# Patient Record
Sex: FEMALE | Race: WHITE
[De-identification: names, ages, dates, MRNs, and addresses within clinical notes are randomized per-mention and may not be internally consistent; named-entity substitution may affect disease eponyms.]

---

## 2020-06-14 ENCOUNTER — TRANSCRIPTION ENCOUNTER (OUTPATIENT)
Age: 30
End: 2020-06-14

## 2020-08-07 ENCOUNTER — APPOINTMENT (OUTPATIENT)
Dept: INTERNAL MEDICINE | Facility: CLINIC | Age: 30
End: 2020-08-07
Payer: COMMERCIAL

## 2020-08-07 ENCOUNTER — OUTPATIENT (OUTPATIENT)
Dept: OUTPATIENT SERVICES | Facility: HOSPITAL | Age: 30
LOS: 1 days | Discharge: HOME | End: 2020-08-07

## 2020-08-07 VITALS
TEMPERATURE: 96.8 F | WEIGHT: 140 LBS | SYSTOLIC BLOOD PRESSURE: 119 MMHG | DIASTOLIC BLOOD PRESSURE: 68 MMHG | BODY MASS INDEX: 23.9 KG/M2 | HEIGHT: 64 IN

## 2020-08-07 DIAGNOSIS — J45.990 EXERCISE INDUCED BRONCHOSPASM: ICD-10-CM

## 2020-08-07 PROCEDURE — 99201 OFFICE OUTPATIENT NEW 10 MINUTES: CPT | Mod: GC

## 2020-10-28 PROBLEM — J45.990 EXERCISE-INDUCED ASTHMA: Status: ACTIVE | Noted: 2020-08-07

## 2020-10-28 NOTE — PHYSICAL EXAM
[No Acute Distress] : no acute distress [Normal Sclera/Conjunctiva] : normal sclera/conjunctiva [Normal Outer Ear/Nose] : the outer ears and nose were normal in appearance [No JVD] : no jugular venous distention [No Respiratory Distress] : no respiratory distress  [Normal Rate] : normal rate  [No Edema] : there was no peripheral edema [Soft] : abdomen soft [No HSM] : no HSM [Normal Anterior Cervical Nodes] : no anterior cervical lymphadenopathy [Coordination Grossly Intact] : coordination grossly intact

## 2020-10-28 NOTE — ASSESSMENT
[FreeTextEntry1] : Exercise induced asthma\par - refill LABA/ICS\par \par labs before next visit\par \par RTC 1 year or PRN

## 2020-10-28 NOTE — HISTORY OF PRESENT ILLNESS
[FreeTextEntry8] : 29 Y/O F pmhx exercise induced asthma here to establish care. Asthma controlled on inhaler. Denies N/V/D

## 2020-12-17 ENCOUNTER — TRANSCRIPTION ENCOUNTER (OUTPATIENT)
Age: 30
End: 2020-12-17

## 2020-12-28 ENCOUNTER — TRANSCRIPTION ENCOUNTER (OUTPATIENT)
Age: 30
End: 2020-12-28

## 2021-03-18 LAB
ALBUMIN SERPL ELPH-MCNC: 4.7 G/DL
ALP BLD-CCNC: 32 U/L
ALT SERPL-CCNC: 10 U/L
ANION GAP SERPL CALC-SCNC: 12 MMOL/L
AST SERPL-CCNC: 13 U/L
BASOPHILS # BLD AUTO: 0.05 K/UL
BASOPHILS NFR BLD AUTO: 0.9 %
BILIRUB SERPL-MCNC: <0.2 MG/DL
BUN SERPL-MCNC: 14 MG/DL
CALCIUM SERPL-MCNC: 9.6 MG/DL
CHLORIDE SERPL-SCNC: 101 MMOL/L
CHOLEST SERPL-MCNC: 202 MG/DL
CO2 SERPL-SCNC: 26 MMOL/L
CREAT SERPL-MCNC: 0.8 MG/DL
EOSINOPHIL # BLD AUTO: 0.08 K/UL
EOSINOPHIL NFR BLD AUTO: 1.5 %
ESTIMATED AVERAGE GLUCOSE: 97 MG/DL
GLUCOSE SERPL-MCNC: 133 MG/DL
HBA1C MFR BLD HPLC: 5 %
HCT VFR BLD CALC: 38.4 %
HCV AB SER QL: NONREACTIVE
HCV S/CO RATIO: 0.07 S/CO
HDLC SERPL-MCNC: 68 MG/DL
HGB BLD-MCNC: 12.5 G/DL
HIV1+2 AB SPEC QL IA.RAPID: NONREACTIVE
LDLC SERPL CALC-MCNC: 113 MG/DL
LYMPHOCYTES # BLD AUTO: 2.62 K/UL
LYMPHOCYTES NFR BLD AUTO: 48.3 %
MAN DIFF?: NO
MCHC RBC-ENTMCNC: 29.6 PG
MCHC RBC-ENTMCNC: 32.6 G/DL
MCV RBC AUTO: 90.8 FL
MONOCYTES # BLD AUTO: 0.2 K/UL
MONOCYTES NFR BLD AUTO: 3.7 %
NEUTROPHILS # BLD AUTO: 2.48 K/UL
NEUTROPHILS NFR BLD AUTO: 45.6 %
NONHDLC SERPL-MCNC: 134 MG/DL
PLATELET # BLD AUTO: 227 K/UL
POTASSIUM SERPL-SCNC: 4.1 MMOL/L
PROT SERPL-MCNC: 7 G/DL
RBC # BLD: 4.23 M/UL
RBC # FLD: 12.3 %
SODIUM SERPL-SCNC: 139 MMOL/L
TRIGL SERPL-MCNC: 106 MG/DL
TSH SERPL-ACNC: 1.24 UIU/ML
WBC # FLD AUTO: 5.43 K/UL

## 2021-05-20 ENCOUNTER — NON-APPOINTMENT (OUTPATIENT)
Age: 31
End: 2021-05-20

## 2021-05-21 ENCOUNTER — APPOINTMENT (OUTPATIENT)
Dept: GASTROENTEROLOGY | Facility: CLINIC | Age: 31
End: 2021-05-21

## 2021-05-21 ENCOUNTER — APPOINTMENT (OUTPATIENT)
Dept: GASTROENTEROLOGY | Facility: CLINIC | Age: 31
End: 2021-05-21
Payer: COMMERCIAL

## 2021-05-21 ENCOUNTER — NON-APPOINTMENT (OUTPATIENT)
Age: 31
End: 2021-05-21

## 2021-05-21 ENCOUNTER — OUTPATIENT (OUTPATIENT)
Dept: OUTPATIENT SERVICES | Facility: HOSPITAL | Age: 31
LOS: 1 days | Discharge: HOME | End: 2021-05-21

## 2021-05-21 VITALS
WEIGHT: 137 LBS | HEART RATE: 80 BPM | TEMPERATURE: 97.5 F | BODY MASS INDEX: 23.39 KG/M2 | DIASTOLIC BLOOD PRESSURE: 88 MMHG | OXYGEN SATURATION: 99 % | HEIGHT: 64 IN | SYSTOLIC BLOOD PRESSURE: 130 MMHG

## 2021-05-21 PROCEDURE — 99204 OFFICE O/P NEW MOD 45 MIN: CPT | Mod: GC

## 2021-05-21 RX ORDER — POLYETHYLENE GLYCOL 3350 17 G/17G
17 POWDER, FOR SOLUTION ORAL
Qty: 1 | Refills: 0 | Status: ACTIVE | COMMUNITY
Start: 2021-05-21 | End: 1900-01-01

## 2021-05-21 RX ORDER — SODIUM SULFATE, POTASSIUM SULFATE, MAGNESIUM SULFATE 17.5; 3.13; 1.6 G/ML; G/ML; G/ML
17.5-3.13-1.6 SOLUTION, CONCENTRATE ORAL
Qty: 1 | Refills: 0 | Status: ACTIVE | COMMUNITY
Start: 2021-05-21 | End: 1900-01-01

## 2021-05-21 NOTE — ASSESSMENT
[FreeTextEntry1] : 31 year old female with h/o asthma is here for initial evaluation for diarrhea \par Pt reports diarrhea on and off for long time and also had a colonoscopy in 2014 and was reportedly normal. Recently pt started having approx 3 loose bm /day since dec 2020 worse with food intake, not with any particular diet. Also noted stool floating in toilet bowel with min on and off blood on toilet paper . Pt also reports 20 lbs unintentional weight loss for past 6 months \par No fever, abd pain, nausea, vomiting, melena, dysphagia , family h/o gi malignancy / IBD \par No nocturnal  abdominal pain / diarrhea . \par \par no prior abd imaging available \par recent labs CBC, CMP were grossly unremarkable \par \par #) Chronic diarrhea, on and off \par assoc with weight loss, stool floating in toilet bowel \par assoc with brbpr \par Possible inflammatory vs malabsorption vs r/o infectious causes and others \par Rec:\par Lactose free diet \par check Stool GI PCR, C.diff, o and P\par Check stool lactoferrin, calprotectin, stool elastase  ESR, CRP \par Check TSH , celiac serologies \par Plan for EGD and colonoscopy with biopsies \par will plan for further work up based on above results \par RTC after above \par \par \par

## 2021-05-21 NOTE — END OF VISIT
[] : Fellow [FreeTextEntry3] : chronic diarrhea with floating stools - check stool studies to r/o infection, inflammatory disease, pancreatic insufficiency; EGD/CF

## 2021-05-21 NOTE — HISTORY OF PRESENT ILLNESS
[Heartburn] : denies heartburn [Nausea] : denies nausea [Vomiting] : denies vomiting [Constipation] : denies constipation [Yellow Skin Or Eyes (Jaundice)] : denies jaundice [Abdominal Pain] : denies abdominal pain [Abdominal Swelling] : denies abdominal swelling [Rectal Pain] : denies rectal pain [Wt Loss ___ Lbs] : recent [unfilled] ~Upound(s) weight loss [Diarrhea] : diarrhea [Fair Compliance] : fair compliance with treatment [de-identified] : 31 year old female with h/o asthma is here for initial evaluation for diarrhea \par Pt reports diarrhea on and off for long time and also had a colonoscopy in 2014 and was reportedly normal. Recently pt started having approx 3 loose bm /day since dec 2020 worse with food intake, not with any particular diet. Also noted stool floating in toilet bowel with min on and off blood on toilet paper . Pt also reports 20 lbs unintentional weight loss for past 6 months \par No fever, abd pain, nausea, vomiting, melena, dysphagia , family h/o gi malignancy / IBD \par No nocturnal  abdominal pain / diarrhea . \par \par no prior abd imaging available \par recent labs CBC, CMP were grossly unremarkable \par

## 2021-06-14 ENCOUNTER — OUTPATIENT (OUTPATIENT)
Dept: OUTPATIENT SERVICES | Facility: HOSPITAL | Age: 31
LOS: 1 days | Discharge: HOME | End: 2021-06-14

## 2021-06-14 ENCOUNTER — LABORATORY RESULT (OUTPATIENT)
Age: 31
End: 2021-06-14

## 2021-06-14 DIAGNOSIS — Z11.59 ENCOUNTER FOR SCREENING FOR OTHER VIRAL DISEASES: ICD-10-CM

## 2021-06-16 ENCOUNTER — RESULT REVIEW (OUTPATIENT)
Age: 31
End: 2021-06-16

## 2021-06-16 ENCOUNTER — TRANSCRIPTION ENCOUNTER (OUTPATIENT)
Age: 31
End: 2021-06-16

## 2021-06-16 ENCOUNTER — OUTPATIENT (OUTPATIENT)
Dept: OUTPATIENT SERVICES | Facility: HOSPITAL | Age: 31
LOS: 1 days | Discharge: HOME | End: 2021-06-16
Payer: COMMERCIAL

## 2021-06-16 VITALS
SYSTOLIC BLOOD PRESSURE: 129 MMHG | RESPIRATION RATE: 17 BRPM | TEMPERATURE: 98 F | DIASTOLIC BLOOD PRESSURE: 60 MMHG | HEART RATE: 67 BPM | OXYGEN SATURATION: 98 %

## 2021-06-16 VITALS
HEART RATE: 76 BPM | HEIGHT: 64 IN | WEIGHT: 136.91 LBS | TEMPERATURE: 98 F | RESPIRATION RATE: 18 BRPM | DIASTOLIC BLOOD PRESSURE: 69 MMHG | SYSTOLIC BLOOD PRESSURE: 126 MMHG

## 2021-06-16 DIAGNOSIS — Z90.89 ACQUIRED ABSENCE OF OTHER ORGANS: Chronic | ICD-10-CM

## 2021-06-16 PROCEDURE — 88305 TISSUE EXAM BY PATHOLOGIST: CPT | Mod: 26

## 2021-06-16 PROCEDURE — 45380 COLONOSCOPY AND BIOPSY: CPT | Mod: XS

## 2021-06-16 PROCEDURE — 88312 SPECIAL STAINS GROUP 1: CPT | Mod: 26

## 2021-06-16 PROCEDURE — 43239 EGD BIOPSY SINGLE/MULTIPLE: CPT

## 2021-06-16 RX ORDER — SODIUM CHLORIDE 9 MG/ML
1000 INJECTION, SOLUTION INTRAVENOUS
Refills: 0 | Status: DISCONTINUED | OUTPATIENT
Start: 2021-06-16 | End: 2021-06-30

## 2021-06-16 RX ORDER — BUDESONIDE AND FORMOTEROL FUMARATE DIHYDRATE 160; 4.5 UG/1; UG/1
2 AEROSOL RESPIRATORY (INHALATION)
Qty: 0 | Refills: 0 | DISCHARGE

## 2021-06-16 RX ORDER — ALBUTEROL 90 UG/1
2 AEROSOL, METERED ORAL
Qty: 0 | Refills: 0 | DISCHARGE

## 2021-06-16 NOTE — H&P PST ADULT - HISTORY OF PRESENT ILLNESS
31 female is here for EGD and Colonoscopy with biopsies  for chronic diarrhea with weight loss, floating stool and brbpr

## 2021-06-17 LAB — SURGICAL PATHOLOGY STUDY: SIGNIFICANT CHANGE UP

## 2021-06-18 LAB — SURGICAL PATHOLOGY STUDY: SIGNIFICANT CHANGE UP

## 2021-06-23 DIAGNOSIS — K29.50 UNSPECIFIED CHRONIC GASTRITIS WITHOUT BLEEDING: ICD-10-CM

## 2021-06-23 DIAGNOSIS — K20.90 ESOPHAGITIS, UNSPECIFIED WITHOUT BLEEDING: ICD-10-CM

## 2021-06-23 DIAGNOSIS — J45.909 UNSPECIFIED ASTHMA, UNCOMPLICATED: ICD-10-CM

## 2021-06-23 DIAGNOSIS — D12.8 BENIGN NEOPLASM OF RECTUM: ICD-10-CM

## 2021-06-23 DIAGNOSIS — R19.7 DIARRHEA, UNSPECIFIED: ICD-10-CM

## 2021-06-25 ENCOUNTER — APPOINTMENT (OUTPATIENT)
Dept: GASTROENTEROLOGY | Facility: CLINIC | Age: 31
End: 2021-06-25
Payer: COMMERCIAL

## 2021-06-25 ENCOUNTER — OUTPATIENT (OUTPATIENT)
Dept: OUTPATIENT SERVICES | Facility: HOSPITAL | Age: 31
LOS: 1 days | Discharge: HOME | End: 2021-06-25

## 2021-06-25 ENCOUNTER — NON-APPOINTMENT (OUTPATIENT)
Age: 31
End: 2021-06-25

## 2021-06-25 VITALS
TEMPERATURE: 96.2 F | OXYGEN SATURATION: 99 % | DIASTOLIC BLOOD PRESSURE: 53 MMHG | HEART RATE: 69 BPM | SYSTOLIC BLOOD PRESSURE: 116 MMHG | HEIGHT: 64 IN | WEIGHT: 137 LBS | BODY MASS INDEX: 23.39 KG/M2

## 2021-06-25 DIAGNOSIS — K52.9 NONINFECTIVE GASTROENTERITIS AND COLITIS, UNSPECIFIED: ICD-10-CM

## 2021-06-25 DIAGNOSIS — Z90.89 ACQUIRED ABSENCE OF OTHER ORGANS: Chronic | ICD-10-CM

## 2021-06-25 PROCEDURE — 99214 OFFICE O/P EST MOD 30 MIN: CPT | Mod: GC

## 2021-06-25 NOTE — HISTORY OF PRESENT ILLNESS
[___ Month(s) Ago] : [unfilled] month(s) ago [Ordering Test(s) ___] : ordering [unfilled] [Heartburn] : denies heartburn [Nausea] : denies nausea [Vomiting] : denies vomiting [Diarrhea] : stable diarrhea [Constipation] : denies constipation [Yellow Skin Or Eyes (Jaundice)] : denies jaundice [Abdominal Pain] : denies abdominal pain [Abdominal Swelling] : denies abdominal swelling [Rectal Pain] : denies rectal pain [Fair Compliance] : fair compliance with treatment [de-identified] : s/p EGD and colonoscopy  [de-identified] :  31 year old female with h/o asthma is following in GI Clinic for chronic diarrhea with flaoting stools and weight loss\par pt symptoms are ongoing and is stable \par CBC, LFT were unremarkable \par EGD - non erosive gastritis (path -no HP, no CD ) \par Colonoscopy - two small rectal polyps (TA)  otherwise normal colon and TI ( path - no colitis ) \par stool studies were not performed yet \par

## 2021-06-25 NOTE — END OF VISIT
[FreeTextEntry3] : pt with ongoing diarrhea, EGD/CF negative for inflammatory/infectious etiology, negative for celiac/HP. perform stool tests to r/o infection, inflammatory, malabsorption. f/u after stool studies done.  [] : Fellow

## 2021-06-25 NOTE — ASSESSMENT
[FreeTextEntry1] :  31 year old female with h/o asthma is following in GI Clinic for chronic diarrhea with flaoting stools and weight loss\par pt symptoms are ongoing and is stable \par CBC, LFT were unremarkable \par EGD - non erosive gastritis (path -no HP, no CD ) \par Colonoscopy - two small rectal polyps (TA)  otherwise normal colon and TI ( path - no colitis ) \par stool studies were not performed yet \par \par #) Chronic diarrhea, \par Inflammatory conditions were ruled out \par R/o Malabsorptive and infectious causes (unlikely) \par Rec:\par Lactose free diet \par check Stool GI PCR, C.diff, o and P\par Check stool lactoferrin, calprotectin, stool elastase \par If above work up is unremarkable it is possible that she is having IBS-D \par RTC in one month \par \par #) Two small rectal polyps , TA \par family h/o colon cancer \par repeat colonoscopy at 5 years for surveillance \par \par \par \par \par

## 2021-07-14 LAB
C DIFF TOX GENS STL QL NAA+PROBE: NORMAL
CDIFF BY PCR: NEGATIVE

## 2021-07-15 LAB — GI PCR PANEL, STOOL: NORMAL

## 2021-07-16 LAB — DEPRECATED O AND P PREP STL: NORMAL

## 2021-07-20 LAB
CALPROTECTIN FECAL: <16 UG/G
PANCREATIC ELASTASE, FECAL: 294

## 2021-07-21 LAB — LACTOFERRIN STL-MCNC: <1

## 2022-03-02 ENCOUNTER — RESULT CHARGE (OUTPATIENT)
Age: 32
End: 2022-03-02

## 2022-03-03 ENCOUNTER — NON-APPOINTMENT (OUTPATIENT)
Age: 32
End: 2022-03-03

## 2022-03-03 ENCOUNTER — OUTPATIENT (OUTPATIENT)
Dept: OUTPATIENT SERVICES | Facility: HOSPITAL | Age: 32
LOS: 1 days | Discharge: HOME | End: 2022-03-03
Payer: COMMERCIAL

## 2022-03-03 ENCOUNTER — APPOINTMENT (OUTPATIENT)
Dept: INTERNAL MEDICINE | Facility: CLINIC | Age: 32
End: 2022-03-03
Payer: SELF-PAY

## 2022-03-03 VITALS
OXYGEN SATURATION: 98 % | SYSTOLIC BLOOD PRESSURE: 112 MMHG | DIASTOLIC BLOOD PRESSURE: 69 MMHG | WEIGHT: 140 LBS | HEART RATE: 66 BPM | HEIGHT: 64 IN | BODY MASS INDEX: 23.9 KG/M2 | TEMPERATURE: 98.1 F

## 2022-03-03 DIAGNOSIS — R00.2 PALPITATIONS: ICD-10-CM

## 2022-03-03 DIAGNOSIS — Z90.89 ACQUIRED ABSENCE OF OTHER ORGANS: Chronic | ICD-10-CM

## 2022-03-03 PROCEDURE — 99212 OFFICE O/P EST SF 10 MIN: CPT | Mod: GC

## 2022-03-03 PROCEDURE — 93010 ELECTROCARDIOGRAM REPORT: CPT

## 2022-03-03 RX ORDER — LEVONORGESTREL AND ETHINYL ESTRADIOL 0.1-0.02MG
0.1-2 KIT ORAL DAILY
Qty: 1 | Refills: 1 | Status: ACTIVE | COMMUNITY
Start: 2020-08-07 | End: 1900-01-01

## 2022-03-03 RX ORDER — FLUTICASONE FUROATE AND VILANTEROL TRIFENATATE 100; 25 UG/1; UG/1
100-25 POWDER RESPIRATORY (INHALATION)
Qty: 1 | Refills: 11 | Status: ACTIVE | COMMUNITY
Start: 2020-08-07 | End: 1900-01-01

## 2022-03-04 LAB
BASOPHILS # BLD AUTO: 0.07 K/UL
BASOPHILS NFR BLD AUTO: 1.1 %
CHOLEST SERPL-MCNC: 188 MG/DL
EOSINOPHIL # BLD AUTO: 0.3 K/UL
EOSINOPHIL NFR BLD AUTO: 4.8 %
ESTIMATED AVERAGE GLUCOSE: 97 MG/DL
HBA1C MFR BLD HPLC: 5 %
HCT VFR BLD CALC: 36.5 %
HDLC SERPL-MCNC: 69 MG/DL
HGB BLD-MCNC: 12 G/DL
HIV1+2 AB SPEC QL IA.RAPID: NONREACTIVE
IMM GRANULOCYTES NFR BLD AUTO: 0.2 %
LDLC SERPL CALC-MCNC: 102 MG/DL
LYMPHOCYTES # BLD AUTO: 1.88 K/UL
LYMPHOCYTES NFR BLD AUTO: 30 %
MAN DIFF?: NORMAL
MCHC RBC-ENTMCNC: 29.4 PG
MCHC RBC-ENTMCNC: 32.9 G/DL
MCV RBC AUTO: 89.5 FL
MONOCYTES # BLD AUTO: 0.36 K/UL
MONOCYTES NFR BLD AUTO: 5.7 %
NEUTROPHILS # BLD AUTO: 3.65 K/UL
NEUTROPHILS NFR BLD AUTO: 58.2 %
NONHDLC SERPL-MCNC: 119 MG/DL
PLATELET # BLD AUTO: 261 K/UL
RBC # BLD: 4.08 M/UL
RBC # FLD: 12.5 %
TRIGL SERPL-MCNC: 86 MG/DL
WBC # FLD AUTO: 6.27 K/UL

## 2022-05-25 PROBLEM — R00.2 INTERMITTENT PALPITATIONS: Status: ACTIVE | Noted: 2022-03-03

## 2022-05-25 NOTE — PHYSICAL EXAM
[No Acute Distress] : no acute distress [Normal Rate] : normal rate  [Regular Rhythm] : with a regular rhythm [Normal S1, S2] : normal S1 and S2 [No Murmur] : no murmur heard [Normal Gait] : normal gait [Speech Grossly Normal] : speech grossly normal [Memory Grossly Normal] : memory grossly normal [Normal Affect] : the affect was normal [Normal Mood] : the mood was normal [Normal Insight/Judgement] : insight and judgment were intact [de-identified] : additional beat noted

## 2022-05-25 NOTE — HISTORY OF PRESENT ILLNESS
[FreeTextEntry1] : intermittant palpitations [de-identified] : here for palpitations, has had occasionally since college\par no SOB or CP during episode\par worsens with strong coffee and stress\par occasional cigarette use\par

## 2022-05-25 NOTE — ASSESSMENT
[FreeTextEntry1] : palpitation\par - ekg\par - low risk\par - discussed caffeine cessation and stress reduction\par - may benefit from BB at some point if worsens \par \par gyn\par - needs to see gyn\par - refilled OCP for 2 more months

## 2022-08-18 LAB
ALBUMIN SERPL ELPH-MCNC: 4.7 G/DL
ALP BLD-CCNC: 33 U/L
ALT SERPL-CCNC: 11 U/L
ANION GAP SERPL CALC-SCNC: 13 MMOL/L
AST SERPL-CCNC: 13 U/L
BILIRUB SERPL-MCNC: 0.2 MG/DL
BUN SERPL-MCNC: 13 MG/DL
CALCIUM SERPL-MCNC: 9.7 MG/DL
CHLORIDE SERPL-SCNC: 104 MMOL/L
CO2 SERPL-SCNC: 22 MMOL/L
CREAT SERPL-MCNC: 0.7 MG/DL
EGFR: 118 ML/MIN/1.73M2
GLUCOSE SERPL-MCNC: 82 MG/DL
HCV AB SER QL: NONREACTIVE
HCV S/CO RATIO: 0.1 S/CO
POTASSIUM SERPL-SCNC: 4.8 MMOL/L
PROT SERPL-MCNC: 7 G/DL
SODIUM SERPL-SCNC: 139 MMOL/L
T PALLIDUM AB SER QL IA: NEGATIVE
TSH SERPL-ACNC: 0.91 UIU/ML

## 2022-10-06 ENCOUNTER — NON-APPOINTMENT (OUTPATIENT)
Age: 32
End: 2022-10-06

## 2022-10-07 ENCOUNTER — APPOINTMENT (OUTPATIENT)
Dept: INTERNAL MEDICINE | Facility: CLINIC | Age: 32
End: 2022-10-07

## 2022-10-07 ENCOUNTER — OUTPATIENT (OUTPATIENT)
Dept: OUTPATIENT SERVICES | Facility: HOSPITAL | Age: 32
LOS: 1 days | Discharge: HOME | End: 2022-10-07

## 2022-10-07 ENCOUNTER — NON-APPOINTMENT (OUTPATIENT)
Age: 32
End: 2022-10-07

## 2022-10-07 VITALS
HEART RATE: 64 BPM | DIASTOLIC BLOOD PRESSURE: 66 MMHG | OXYGEN SATURATION: 98 % | WEIGHT: 135 LBS | BODY MASS INDEX: 23.05 KG/M2 | SYSTOLIC BLOOD PRESSURE: 97 MMHG | HEIGHT: 64 IN

## 2022-10-07 DIAGNOSIS — G89.29 PAIN IN LEFT SHOULDER: ICD-10-CM

## 2022-10-07 DIAGNOSIS — M25.512 PAIN IN LEFT SHOULDER: ICD-10-CM

## 2022-10-07 DIAGNOSIS — Z90.89 ACQUIRED ABSENCE OF OTHER ORGANS: Chronic | ICD-10-CM

## 2022-10-07 DIAGNOSIS — Z00.00 ENCOUNTER FOR GENERAL ADULT MEDICAL EXAMINATION W/OUT ABNORMAL FINDINGS: ICD-10-CM

## 2022-10-07 PROCEDURE — 99213 OFFICE O/P EST LOW 20 MIN: CPT | Mod: GC
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
## 2022-10-07 NOTE — REVIEW OF SYSTEMS
[Joint Pain] : joint pain [Joint Stiffness] : joint stiffness [Muscle Pain] : muscle pain [Negative] : Genitourinary

## 2022-10-07 NOTE — PHYSICAL EXAM
[No Acute Distress] : no acute distress [No Respiratory Distress] : no respiratory distress  [No Accessory Muscle Use] : no accessory muscle use [Clear to Auscultation] : lungs were clear to auscultation bilaterally [Normal Rate] : normal rate  [Regular Rhythm] : with a regular rhythm [Normal S1, S2] : normal S1 and S2 [No Murmur] : no murmur heard [No Edema] : there was no peripheral edema [Soft] : abdomen soft [Non Tender] : non-tender [Non-distended] : non-distended [Normal Bowel Sounds] : normal bowel sounds [No Focal Deficits] : no focal deficits [de-identified] : limited ROM 2/2 pain

## 2022-10-13 DIAGNOSIS — M25.512 PAIN IN LEFT SHOULDER: ICD-10-CM

## 2022-10-13 DIAGNOSIS — Z00.00 ENCOUNTER FOR GENERAL ADULT MEDICAL EXAMINATION WITHOUT ABNORMAL FINDINGS: ICD-10-CM

## 2022-12-31 ENCOUNTER — EMERGENCY (EMERGENCY)
Facility: HOSPITAL | Age: 32
LOS: 0 days | Discharge: HOME | End: 2022-12-31
Attending: EMERGENCY MEDICINE | Admitting: EMERGENCY MEDICINE
Payer: COMMERCIAL

## 2022-12-31 VITALS
OXYGEN SATURATION: 98 % | TEMPERATURE: 98 F | WEIGHT: 154.32 LBS | HEART RATE: 80 BPM | DIASTOLIC BLOOD PRESSURE: 68 MMHG | SYSTOLIC BLOOD PRESSURE: 111 MMHG | RESPIRATION RATE: 16 BRPM
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
## 2022-12-31 DIAGNOSIS — J45.909 UNSPECIFIED ASTHMA, UNCOMPLICATED: ICD-10-CM

## 2022-12-31 DIAGNOSIS — M25.562 PAIN IN LEFT KNEE: ICD-10-CM

## 2022-12-31 DIAGNOSIS — M25.561 PAIN IN RIGHT KNEE: ICD-10-CM

## 2022-12-31 DIAGNOSIS — Z90.89 ACQUIRED ABSENCE OF OTHER ORGANS: Chronic | ICD-10-CM

## 2022-12-31 DIAGNOSIS — M79.662 PAIN IN LEFT LOWER LEG: ICD-10-CM
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
## 2022-12-31 DIAGNOSIS — Z90.09 ACQUIRED ABSENCE OF OTHER PART OF HEAD AND NECK: ICD-10-CM
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
## 2022-12-31 PROCEDURE — 73590 X-RAY EXAM OF LOWER LEG: CPT | Mod: 26,LT

## 2022-12-31 PROCEDURE — 73562 X-RAY EXAM OF KNEE 3: CPT | Mod: 26,50

## 2022-12-31 PROCEDURE — 99284 EMERGENCY DEPT VISIT MOD MDM: CPT

## 2022-12-31 NOTE — ED PROVIDER NOTE - OBJECTIVE STATEMENT
32 F hx of asthma presents to ED for left knee pain s/p mechanical trip and fall last week. sts that she was walking into work when she fell and landed on her knees. sts she had minor abrasions but since th e fall has had continued pain to left knee worse with ambulation and pressure. pt able to walk on knee but endorses medial pain

## 2022-12-31 NOTE — ED PROVIDER NOTE - NS ED ROS FT
Constitutional: (-) fever, (-) chills  Eyes: (-) visual changes  ENT: (-) nasal congestions  Cardiovascular: (-) chest pain, (-) syncope  Respiratory: (-) cough, (-) shortness of breath, (-) dyspnea,   Gastrointestinal: (-) vomiting, (-) diarrhea, (-)nausea,  Musculoskeletal: (-) neck pain, (-) back pain, (-) joint pain,  Integumentary: (-) rash, (-) edema, (-) bruises  Neurological: (-) headache, (-) loc, (-) dizziness, (-) tingling, (-)numbness,    Peripheral Vascular: (-) leg swelling  :  (-)dysuria,  (-) hematuria  Allergic/Immunologic: (-) pruritus

## 2022-12-31 NOTE — ED PROVIDER NOTE - CLINICAL SUMMARY MEDICAL DECISION MAKING FREE TEXT BOX
Patient presented s/p mechanical fall last week, complaining of b/l knee pain. No head trauma or LOC. Otherwise afebrile, HD stable, neurovascularly intact. (+) tender on L tib/fib as well. Xrays obtained and negative for acute fx or dislocation. Patient able to ambulate without difficulty. Given the above, will discharge home with outpatient follow up. Patient agreeable with plan. Agrees to return to ED for any new or worsening symptoms.

## 2022-12-31 NOTE — ED PROVIDER NOTE - NSFOLLOWUPINSTRUCTIONS_ED_ALL_ED_FT
Please follow up with orthopedics in the next 1-3 days     Our Emergency Department Referral Coordinators will be reaching out to you in the next 24-48 hours from 9:00am to 5:00pm with a follow up appointment. Please expect a phone call from the hospital in that time frame. If you do not receive a call or if you have any questions or concerns, you can reach them at [phone] or [phone].     Knee Pain    Knee pain is a very common symptom and can have many causes. Knee pain often goes away when you follow your health care provider's instructions for relieving pain and discomfort at home. However, knee pain can develop into a condition that needs treatment. Some conditions may include:     Arthritis caused by wear and tear (osteoarthritis).  Arthritis caused by swelling and irritation (rheumatoid arthritis or gout).  A cyst or growth in your knee.  An infection in your knee joint.  An injury that will not heal.  Damage, swelling, or irritation of the tissues that support your knee (torn ligaments or tendinitis).    If your knee pain continues, additional tests may be ordered to diagnose your condition. Tests may include X-rays or other imaging studies of your knee. You may also need to have fluid removed from your knee. Treatment for ongoing knee pain depends on the cause, but treatment may include:    Medicines to relieve pain or swelling.  Steroid injections in your knee.  Physical therapy.  Surgery.    HOME CARE INSTRUCTIONS  Take medicines only as directed by your health care provider.   Rest your knee and keep it raised (elevated) while you are resting.  Do not do things that cause or worsen pain.  Avoid high-impact activities or exercises, such as running, jumping rope, or doing jumping jacks.  Apply ice to the knee area:  Put ice in a plastic bag.  Place a towel between your skin and the bag.  Leave the ice on for 20 minutes, 2–3 times a day.  Ask your health care provider if you should wear an elastic knee support.  Keep a pillow under your knee when you sleep.  Lose weight if you are overweight. Extra weight can put pressure on your knee.  Do not use any tobacco products, including cigarettes, chewing tobacco, or electronic cigarettes. If you need help quitting, ask your health care provider. Smoking may slow the healing of any bone and joint problems that you may have.    SEEK MEDICAL CARE IF:  Your knee pain continues, changes, or gets worse.  You have a fever along with knee pain.  Your knee marycarmen or locks up.  Your knee becomes more swollen.    SEEK IMMEDIATE MEDICAL CARE IF:  Your knee joint feels hot to the touch.  You have chest pain or trouble breathing.

## 2022-12-31 NOTE — ED PROVIDER NOTE - PHYSICAL EXAMINATION
VITAL SIGNS: I have reviewed nursing notes and confirm.  CONSTITUTIONAL:  in no acute distress.  SKIN: Skin exam is warm and dry, no acute rash.  HEAD: Normocephalic; atraumatic.  EYES: EOM intact; conjunctiva and sclera clear.  ENT: No nasal discharge; airway clear.   NECK: Supple; non tender.  CARD: S1, S2 normal; no murmurs, gallops, or rubs. Regular rate and rhythm.  RESP: No wheezes, rales or rhonchi. Speaking in full sentences.   ABD:  soft; non-distended; non-tender No rebound or guarding. No CVA tenderness.  EXT: Normal ROM. swelling to Left knee, ttp of medial aspect of knee, No clubbing, cyanosis or edema.  NEURO: Alert, oriented. Grossly unremarkable. No focal deficits.

## 2022-12-31 NOTE — ED PROVIDER NOTE - PATIENT PORTAL LINK FT
You can access the FollowMyHealth Patient Portal offered by [facility] by registering at the following website: [email or web address]. By joining [facility]’s FollowMyHealth portal, you will also be able to view your health information using other applications (apps) compatible with our system.

## 2022-12-31 NOTE — ED ADULT NURSE NOTE - NSHOSCREENINGQ1_ED_ALL_ED
2019      Charis Watson MD    [address] [place] [address]       RE: Александр Montaño   MRN: [id number]   : 2017      Dear Dr. Watson:       It was a pleasure to see your patient, Александр, here at the Pediatric Surgery Clinic at the [facility].  As you recall, Александр is a young lady who I recently took to the operating room and performed an uneventful repair of an umbilical hernia.  In followup today, she is doing exceptionally well.  She reports no issues or concerns.      On exam, incision is healing very nicely.  There is a small foreign body reaction to her subcutaneous stitch on the lateral side of her incision.  This should go away with warm tub soaks in the next few weeks.  I am not concerned that there is an infection at all.      I appreciate the opportunity to be able to participate in the care of your patient.  If there are any questions or concerns, please do not hesitate to contact me.      Sincerely,      Umesh Hobson MD   Pediatric Surgery     See dictation   No

## 2022-12-31 NOTE — ED PROVIDER NOTE - NS ED ATTENDING STATEMENT MOD
This was a shared visit with the SRINIVASA. I reviewed and verified the documentation and independently performed the documented:

## 2023-05-19 ENCOUNTER — EMERGENCY (EMERGENCY)
Facility: HOSPITAL | Age: 33
LOS: 0 days | Discharge: HOME | End: 2023-05-19
Attending: STUDENT IN AN ORGANIZED HEALTH CARE EDUCATION/TRAINING PROGRAM
Payer: COMMERCIAL

## 2023-05-19 VITALS
WEIGHT: 139.99 LBS | HEART RATE: 84 BPM | TEMPERATURE: 98 F | HEIGHT: 64 IN | SYSTOLIC BLOOD PRESSURE: 132 MMHG | RESPIRATION RATE: 18 BRPM | OXYGEN SATURATION: 100 % | DIASTOLIC BLOOD PRESSURE: 81 MMHG

## 2023-05-19 DIAGNOSIS — Z90.89 ACQUIRED ABSENCE OF OTHER ORGANS: Chronic | ICD-10-CM

## 2023-05-19 DIAGNOSIS — Z90.89 ACQUIRED ABSENCE OF OTHER ORGANS: ICD-10-CM

## 2023-05-19 DIAGNOSIS — R00.2 PALPITATIONS: ICD-10-CM

## 2023-05-19 DIAGNOSIS — R55 SYNCOPE AND COLLAPSE: ICD-10-CM

## 2023-05-19 DIAGNOSIS — J45.909 UNSPECIFIED ASTHMA, UNCOMPLICATED: ICD-10-CM

## 2023-05-19 DIAGNOSIS — I49.3 VENTRICULAR PREMATURE DEPOLARIZATION: ICD-10-CM

## 2023-05-19 LAB
ALBUMIN SERPL ELPH-MCNC: 4.7 G/DL — SIGNIFICANT CHANGE UP (ref 3.5–5.2)
ALP SERPL-CCNC: 31 U/L — SIGNIFICANT CHANGE UP (ref 30–115)
ALT FLD-CCNC: 13 U/L — SIGNIFICANT CHANGE UP (ref 0–41)
ANION GAP SERPL CALC-SCNC: 11 MMOL/L — SIGNIFICANT CHANGE UP (ref 7–14)
AST SERPL-CCNC: 15 U/L — SIGNIFICANT CHANGE UP (ref 0–41)
BASOPHILS # BLD AUTO: 0.05 K/UL — SIGNIFICANT CHANGE UP (ref 0–0.2)
BASOPHILS NFR BLD AUTO: 0.6 % — SIGNIFICANT CHANGE UP (ref 0–1)
BILIRUB SERPL-MCNC: <0.2 MG/DL — SIGNIFICANT CHANGE UP (ref 0.2–1.2)
BUN SERPL-MCNC: 15 MG/DL — SIGNIFICANT CHANGE UP (ref 10–20)
CALCIUM SERPL-MCNC: 9.6 MG/DL — SIGNIFICANT CHANGE UP (ref 8.4–10.5)
CHLORIDE SERPL-SCNC: 106 MMOL/L — SIGNIFICANT CHANGE UP (ref 98–110)
CO2 SERPL-SCNC: 23 MMOL/L — SIGNIFICANT CHANGE UP (ref 17–32)
CREAT SERPL-MCNC: 0.7 MG/DL — SIGNIFICANT CHANGE UP (ref 0.7–1.5)
EGFR: 117 ML/MIN/1.73M2 — SIGNIFICANT CHANGE UP
EOSINOPHIL # BLD AUTO: 0.02 K/UL — SIGNIFICANT CHANGE UP (ref 0–0.7)
EOSINOPHIL NFR BLD AUTO: 0.2 % — SIGNIFICANT CHANGE UP (ref 0–8)
GLUCOSE SERPL-MCNC: 86 MG/DL — SIGNIFICANT CHANGE UP (ref 70–99)
HCG SERPL QL: NEGATIVE — SIGNIFICANT CHANGE UP
HCT VFR BLD CALC: 36.3 % — LOW (ref 37–47)
HGB BLD-MCNC: 12.3 G/DL — SIGNIFICANT CHANGE UP (ref 12–16)
IMM GRANULOCYTES NFR BLD AUTO: 0.2 % — SIGNIFICANT CHANGE UP (ref 0.1–0.3)
LIDOCAIN IGE QN: 28 U/L — SIGNIFICANT CHANGE UP (ref 7–60)
LYMPHOCYTES # BLD AUTO: 3.53 K/UL — HIGH (ref 1.2–3.4)
LYMPHOCYTES # BLD AUTO: 41.1 % — SIGNIFICANT CHANGE UP (ref 20.5–51.1)
MAGNESIUM SERPL-MCNC: 2 MG/DL — SIGNIFICANT CHANGE UP (ref 1.8–2.4)
MCHC RBC-ENTMCNC: 29.4 PG — SIGNIFICANT CHANGE UP (ref 27–31)
MCHC RBC-ENTMCNC: 33.9 G/DL — SIGNIFICANT CHANGE UP (ref 32–37)
MCV RBC AUTO: 86.6 FL — SIGNIFICANT CHANGE UP (ref 81–99)
MONOCYTES # BLD AUTO: 0.45 K/UL — SIGNIFICANT CHANGE UP (ref 0.1–0.6)
MONOCYTES NFR BLD AUTO: 5.2 % — SIGNIFICANT CHANGE UP (ref 1.7–9.3)
NEUTROPHILS # BLD AUTO: 4.52 K/UL — SIGNIFICANT CHANGE UP (ref 1.4–6.5)
NEUTROPHILS NFR BLD AUTO: 52.7 % — SIGNIFICANT CHANGE UP (ref 42.2–75.2)
NRBC # BLD: 0 /100 WBCS — SIGNIFICANT CHANGE UP (ref 0–0)
PLATELET # BLD AUTO: 257 K/UL — SIGNIFICANT CHANGE UP (ref 130–400)
PMV BLD: SIGNIFICANT CHANGE UP (ref 7.4–10.4)
POTASSIUM SERPL-MCNC: 4.1 MMOL/L — SIGNIFICANT CHANGE UP (ref 3.5–5)
POTASSIUM SERPL-SCNC: 4.1 MMOL/L — SIGNIFICANT CHANGE UP (ref 3.5–5)
PROT SERPL-MCNC: 6.9 G/DL — SIGNIFICANT CHANGE UP (ref 6–8)
RBC # BLD: 4.19 M/UL — LOW (ref 4.2–5.4)
RBC # FLD: 11.9 % — SIGNIFICANT CHANGE UP (ref 11.5–14.5)
SODIUM SERPL-SCNC: 140 MMOL/L — SIGNIFICANT CHANGE UP (ref 135–146)
TROPONIN T SERPL-MCNC: <0.01 NG/ML — SIGNIFICANT CHANGE UP
WBC # BLD: 8.59 K/UL — SIGNIFICANT CHANGE UP (ref 4.8–10.8)
WBC # FLD AUTO: 8.59 K/UL — SIGNIFICANT CHANGE UP (ref 4.8–10.8)

## 2023-05-19 PROCEDURE — 85025 COMPLETE CBC W/AUTO DIFF WBC: CPT

## 2023-05-19 PROCEDURE — 93010 ELECTROCARDIOGRAM REPORT: CPT

## 2023-05-19 PROCEDURE — 84484 ASSAY OF TROPONIN QUANT: CPT

## 2023-05-19 PROCEDURE — 80053 COMPREHEN METABOLIC PANEL: CPT

## 2023-05-19 PROCEDURE — 71046 X-RAY EXAM CHEST 2 VIEWS: CPT | Mod: 26

## 2023-05-19 PROCEDURE — 36415 COLL VENOUS BLD VENIPUNCTURE: CPT

## 2023-05-19 PROCEDURE — 99285 EMERGENCY DEPT VISIT HI MDM: CPT | Mod: 25

## 2023-05-19 PROCEDURE — 83735 ASSAY OF MAGNESIUM: CPT

## 2023-05-19 PROCEDURE — 83690 ASSAY OF LIPASE: CPT

## 2023-05-19 PROCEDURE — 84703 CHORIONIC GONADOTROPIN ASSAY: CPT

## 2023-05-19 PROCEDURE — 71046 X-RAY EXAM CHEST 2 VIEWS: CPT

## 2023-05-19 PROCEDURE — 93005 ELECTROCARDIOGRAM TRACING: CPT

## 2023-05-19 PROCEDURE — 99285 EMERGENCY DEPT VISIT HI MDM: CPT

## 2023-05-19 RX ORDER — SODIUM CHLORIDE 9 MG/ML
1000 INJECTION, SOLUTION INTRAVENOUS ONCE
Refills: 0 | Status: COMPLETED | OUTPATIENT
Start: 2023-05-19 | End: 2023-05-19

## 2023-05-19 RX ADMIN — SODIUM CHLORIDE 1000 MILLILITER(S): 9 INJECTION, SOLUTION INTRAVENOUS at 22:13

## 2023-05-19 NOTE — ED PROVIDER NOTE - NSFOLLOWUPINSTRUCTIONS_ED_ALL_ED_FT
Follow up with cardiology within 1 week for reassessment and with primary care doctor in 1-3 days.    Our Emergency Department Referral Coordinators will be reaching out to you in the next 24-48 hours from 9:00am to 5:00pm with a follow up appointment. Please expect a phone call from the hospital in that time frame. If you do not receive a call or if you have any questions or concerns, you can reach them at [phone].    Palpitations    A palpitation is the feeling that your heartbeat is irregular or is faster than normal. It may feel like your heart is fluttering or skipping a beat. They may be caused by many things, including smoking, caffeine, alcohol, stress, and certain medicines. Although most causes of palpitations are not serious, palpitations can be a sign of a serious medical problem. Avoid caffeine, alcohol, and tobacco products at home. Try to reduce stress and anxiety and make sure to get plenty of rest.     SEEK IMMEDIATE MEDICAL CARE IF YOU HAVE ANY OF THE FOLLOWING SYMPTOMS: chest pain, shortness of breath, severe headache, dizziness/lightheadedness, or fainting.

## 2023-05-19 NOTE — ED PROVIDER NOTE - OBJECTIVE STATEMENT
33 year old female with pmh of asthma presenting to the ED for palpitations and near syncopal episode today. Patient states that she has had occasional PVCs but otherwise had normal EKG done by PMD. Denies any drug use, + caffeine use. Patient states that today she began feeling lightheaded, had sensation of fluttering in her chest and brought herself down to the ground. Denies any leg swelling, no recent surgeries, no recent travel, no hormone use. No fevers/chills. No nausea or vomiting.

## 2023-05-19 NOTE — ED PROVIDER NOTE - PATIENT PORTAL LINK FT
You can access the FollowMyHealth Patient Portal offered by [facility] by registering at the following website: [email or web address]. By joining [facility]’s FollowMyHealth portal, you will also be able to view your health information using other applications (apps) compatible with our system.

## 2023-05-19 NOTE — ED PROVIDER NOTE - PHYSICAL EXAMINATION
CONSTITUTIONAL: Well-developed; well-nourished; in no acute distress.   SKIN: warm, dry; no rashes.  HEAD: Normocephalic; atraumatic.  EYES: PERRLA, EOMI, no conjunctival erythema.  ENT: No nasal discharge; airway clear. MMM.  NECK: Supple; non tender.  CARD: S1, S2 normal; no murmurs, gallops, or rubs. Regular rate and rhythm.   RESP: No wheezes, rales, or rhonchi. Lungs CTAB.  ABD: soft ntnd; no masses, rebound, or guarding.  EXT: Normal ROM.  No clubbing, cyanosis or edema.  NEURO: Alert, oriented, grossly unremarkable. No focal neurological deficits. Ambulating with a steady gait.  PSYCH: Cooperative, appropriate.

## 2023-05-19 NOTE — ED PROVIDER NOTE - DIFFERENTIAL DIAGNOSIS
Differential Diagnosis palpiptations w/ pvc on ekg, will monitor in ED, r/o anemia, lyte abn, unlikely acs given age

## 2023-05-19 NOTE — ED PROVIDER NOTE - ATTENDING CONTRIBUTION TO CARE
34 yo f hx asthma  pt presents for eval of palpitations and near syncope. pt states since January, she has had occasional palpitations.  pt had EKG done by pcp which was "normal" and pt believed sx related to PVCs. no drug use. +caffeine use.  pt states today, she felt palpitations and lightheaded. palpitations described as mid chest fluttering sensation. pt brought herself to the ground and believes she briefly synopsized.  no leg pain/swelling, pleuritic pain, recent surgery/travel, ocp use.      vss  gen- NAD, aaox3  card-rrr  lungs-ctab, no wheezing or rhonchi  abd-sntnd, no guarding or rebound  neuro- full str/sensation, cn ii-xii grossly intact, normal coordination and gait

## 2023-05-19 NOTE — ED ADULT NURSE NOTE - OBJECTIVE STATEMENT
pt c/o chest pain throughout the day associated with "irregular heartbeat" and pre-syncopal episode at  home. Pt went to her PCP back in January for the symptoms and had ekg showed pvc.

## 2023-05-19 NOTE — ED PROVIDER NOTE - CLINICAL SUMMARY MEDICAL DECISION MAKING FREE TEXT BOX
Throughout ED observation period, pt remained clinically and hemodynamically stable.  ekg w/o ischaemic or arrythmogenic findings  no lyte abn  trop neg  pt feeling well in ed  pt comfortable w/ plan for dc w/ cards f/u and return precautions

## 2023-05-19 NOTE — ED ADULT NURSE NOTE - NSFALLUNIVINTERV_ED_ALL_ED
Bed/Stretcher in lowest position, wheels locked, appropriate side rails in place/Call bell, personal items and telephone in reach/Instruct patient to call for assistance before getting out of bed/chair/stretcher/Non-slip footwear applied when patient is off stretcher/[place] to call system/Physically safe environment - no spills, clutter or unnecessary equipment/Purposeful proactive rounding/Room/bathroom lighting operational, light cord in reach

## 2023-06-07 ENCOUNTER — EMERGENCY (EMERGENCY)
Facility: HOSPITAL | Age: 33
LOS: 0 days | Discharge: HOME | End: 2023-06-08
Attending: EMERGENCY MEDICINE
Payer: SELF-PAY

## 2023-06-07 VITALS
TEMPERATURE: 98 F | HEART RATE: 73 BPM | DIASTOLIC BLOOD PRESSURE: 81 MMHG | RESPIRATION RATE: 18 BRPM | HEIGHT: 64 IN | OXYGEN SATURATION: 99 % | WEIGHT: 139.99 LBS | SYSTOLIC BLOOD PRESSURE: 131 MMHG

## 2023-06-07 DIAGNOSIS — Z90.89 ACQUIRED ABSENCE OF OTHER ORGANS: Chronic | ICD-10-CM

## 2023-06-07 DIAGNOSIS — R11.2 NAUSEA WITH VOMITING, UNSPECIFIED: ICD-10-CM

## 2023-06-07 DIAGNOSIS — K52.9 NONINFECTIVE GASTROENTERITIS AND COLITIS, UNSPECIFIED: ICD-10-CM

## 2023-06-07 DIAGNOSIS — R19.7 DIARRHEA, UNSPECIFIED: ICD-10-CM

## 2023-06-07 DIAGNOSIS — J45.909 UNSPECIFIED ASTHMA, UNCOMPLICATED: ICD-10-CM

## 2023-06-07 DIAGNOSIS — R10.13 EPIGASTRIC PAIN: ICD-10-CM

## 2023-06-07 LAB
ALBUMIN SERPL ELPH-MCNC: 4.4 G/DL — SIGNIFICANT CHANGE UP (ref 3.5–5.2)
ALP SERPL-CCNC: 35 U/L — SIGNIFICANT CHANGE UP (ref 30–115)
ALT FLD-CCNC: 9 U/L — SIGNIFICANT CHANGE UP (ref 0–41)
ANION GAP SERPL CALC-SCNC: 15 MMOL/L — HIGH (ref 7–14)
APPEARANCE UR: CLEAR — SIGNIFICANT CHANGE UP
AST SERPL-CCNC: 12 U/L — SIGNIFICANT CHANGE UP (ref 0–41)
BASOPHILS # BLD AUTO: 0.05 K/UL — SIGNIFICANT CHANGE UP (ref 0–0.2)
BASOPHILS NFR BLD AUTO: 0.6 % — SIGNIFICANT CHANGE UP (ref 0–1)
BILIRUB SERPL-MCNC: 0.3 MG/DL — SIGNIFICANT CHANGE UP (ref 0.2–1.2)
BILIRUB UR-MCNC: NEGATIVE — SIGNIFICANT CHANGE UP
BUN SERPL-MCNC: 12 MG/DL — SIGNIFICANT CHANGE UP (ref 10–20)
CALCIUM SERPL-MCNC: 9.2 MG/DL — SIGNIFICANT CHANGE UP (ref 8.4–10.5)
CHLORIDE SERPL-SCNC: 99 MMOL/L — SIGNIFICANT CHANGE UP (ref 98–110)
CO2 SERPL-SCNC: 21 MMOL/L — SIGNIFICANT CHANGE UP (ref 17–32)
COLOR SPEC: ABNORMAL
CREAT SERPL-MCNC: 0.8 MG/DL — SIGNIFICANT CHANGE UP (ref 0.7–1.5)
DIFF PNL FLD: NEGATIVE — SIGNIFICANT CHANGE UP
EGFR: 100 ML/MIN/1.73M2 — SIGNIFICANT CHANGE UP
EOSINOPHIL # BLD AUTO: 0.03 K/UL — SIGNIFICANT CHANGE UP (ref 0–0.7)
EOSINOPHIL NFR BLD AUTO: 0.4 % — SIGNIFICANT CHANGE UP (ref 0–8)
GLUCOSE SERPL-MCNC: 89 MG/DL — SIGNIFICANT CHANGE UP (ref 70–99)
GLUCOSE UR QL: NEGATIVE — SIGNIFICANT CHANGE UP
HCG SERPL QL: NEGATIVE — SIGNIFICANT CHANGE UP
HCT VFR BLD CALC: 34.5 % — LOW (ref 37–47)
HGB BLD-MCNC: 12.2 G/DL — SIGNIFICANT CHANGE UP (ref 12–16)
IMM GRANULOCYTES NFR BLD AUTO: 0.2 % — SIGNIFICANT CHANGE UP (ref 0.1–0.3)
KETONES UR-MCNC: NEGATIVE — SIGNIFICANT CHANGE UP
LEUKOCYTE ESTERASE UR-ACNC: ABNORMAL
LIDOCAIN IGE QN: 21 U/L — SIGNIFICANT CHANGE UP (ref 7–60)
LYMPHOCYTES # BLD AUTO: 2.53 K/UL — SIGNIFICANT CHANGE UP (ref 1.2–3.4)
LYMPHOCYTES # BLD AUTO: 31.3 % — SIGNIFICANT CHANGE UP (ref 20.5–51.1)
MCHC RBC-ENTMCNC: 30.2 PG — SIGNIFICANT CHANGE UP (ref 27–31)
MCHC RBC-ENTMCNC: 35.4 G/DL — SIGNIFICANT CHANGE UP (ref 32–37)
MCV RBC AUTO: 85.4 FL — SIGNIFICANT CHANGE UP (ref 81–99)
MONOCYTES # BLD AUTO: 0.43 K/UL — SIGNIFICANT CHANGE UP (ref 0.1–0.6)
MONOCYTES NFR BLD AUTO: 5.3 % — SIGNIFICANT CHANGE UP (ref 1.7–9.3)
NEUTROPHILS # BLD AUTO: 5.03 K/UL — SIGNIFICANT CHANGE UP (ref 1.4–6.5)
NEUTROPHILS NFR BLD AUTO: 62.2 % — SIGNIFICANT CHANGE UP (ref 42.2–75.2)
NITRITE UR-MCNC: NEGATIVE — SIGNIFICANT CHANGE UP
NRBC # BLD: 0 /100 WBCS — SIGNIFICANT CHANGE UP (ref 0–0)
PH UR: 5.5 — SIGNIFICANT CHANGE UP (ref 5–8)
PLATELET # BLD AUTO: 271 K/UL — SIGNIFICANT CHANGE UP (ref 130–400)
PMV BLD: 11.4 FL — HIGH (ref 7.4–10.4)
POTASSIUM SERPL-MCNC: 3.9 MMOL/L — SIGNIFICANT CHANGE UP (ref 3.5–5)
POTASSIUM SERPL-SCNC: 3.9 MMOL/L — SIGNIFICANT CHANGE UP (ref 3.5–5)
PROT SERPL-MCNC: 6.8 G/DL — SIGNIFICANT CHANGE UP (ref 6–8)
PROT UR-MCNC: NEGATIVE — SIGNIFICANT CHANGE UP
RBC # BLD: 4.04 M/UL — LOW (ref 4.2–5.4)
RBC # FLD: 11.9 % — SIGNIFICANT CHANGE UP (ref 11.5–14.5)
SODIUM SERPL-SCNC: 135 MMOL/L — SIGNIFICANT CHANGE UP (ref 135–146)
SP GR SPEC: 1.01 — SIGNIFICANT CHANGE UP (ref 1.01–1.03)
UROBILINOGEN FLD QL: SIGNIFICANT CHANGE UP
WBC # BLD: 8.09 K/UL — SIGNIFICANT CHANGE UP (ref 4.8–10.8)
WBC # FLD AUTO: 8.09 K/UL — SIGNIFICANT CHANGE UP (ref 4.8–10.8)

## 2023-06-07 PROCEDURE — 99285 EMERGENCY DEPT VISIT HI MDM: CPT

## 2023-06-07 PROCEDURE — 93010 ELECTROCARDIOGRAM REPORT: CPT

## 2023-06-07 PROCEDURE — 84703 CHORIONIC GONADOTROPIN ASSAY: CPT

## 2023-06-07 PROCEDURE — 85025 COMPLETE CBC W/AUTO DIFF WBC: CPT

## 2023-06-07 PROCEDURE — 96374 THER/PROPH/DIAG INJ IV PUSH: CPT

## 2023-06-07 PROCEDURE — 36415 COLL VENOUS BLD VENIPUNCTURE: CPT

## 2023-06-07 PROCEDURE — 99285 EMERGENCY DEPT VISIT HI MDM: CPT | Mod: 25

## 2023-06-07 PROCEDURE — 87086 URINE CULTURE/COLONY COUNT: CPT

## 2023-06-07 PROCEDURE — 71045 X-RAY EXAM CHEST 1 VIEW: CPT

## 2023-06-07 PROCEDURE — 93005 ELECTROCARDIOGRAM TRACING: CPT

## 2023-06-07 PROCEDURE — 80053 COMPREHEN METABOLIC PANEL: CPT

## 2023-06-07 PROCEDURE — 87186 SC STD MICRODIL/AGAR DIL: CPT

## 2023-06-07 PROCEDURE — 83690 ASSAY OF LIPASE: CPT

## 2023-06-07 PROCEDURE — 71045 X-RAY EXAM CHEST 1 VIEW: CPT | Mod: 26

## 2023-06-07 PROCEDURE — 76705 ECHO EXAM OF ABDOMEN: CPT

## 2023-06-07 PROCEDURE — 96375 TX/PRO/DX INJ NEW DRUG ADDON: CPT

## 2023-06-07 PROCEDURE — 81001 URINALYSIS AUTO W/SCOPE: CPT

## 2023-06-07 PROCEDURE — 87077 CULTURE AEROBIC IDENTIFY: CPT

## 2023-06-07 RX ORDER — ONDANSETRON 8 MG/1
4 TABLET, FILM COATED ORAL ONCE
Refills: 0 | Status: COMPLETED | OUTPATIENT
Start: 2023-06-07 | End: 2023-06-07

## 2023-06-07 RX ORDER — FAMOTIDINE 10 MG/ML
20 INJECTION INTRAVENOUS ONCE
Refills: 0 | Status: COMPLETED | OUTPATIENT
Start: 2023-06-07 | End: 2023-06-07

## 2023-06-07 RX ORDER — SODIUM CHLORIDE 9 MG/ML
1000 INJECTION INTRAMUSCULAR; INTRAVENOUS; SUBCUTANEOUS ONCE
Refills: 0 | Status: COMPLETED | OUTPATIENT
Start: 2023-06-07 | End: 2023-06-07

## 2023-06-07 RX ADMIN — FAMOTIDINE 20 MILLIGRAM(S): 10 INJECTION INTRAVENOUS at 20:36

## 2023-06-07 RX ADMIN — SODIUM CHLORIDE 1000 MILLILITER(S): 9 INJECTION INTRAMUSCULAR; INTRAVENOUS; SUBCUTANEOUS at 20:38

## 2023-06-07 RX ADMIN — ONDANSETRON 4 MILLIGRAM(S): 8 TABLET, FILM COATED ORAL at 20:36

## 2023-06-07 NOTE — ED PROVIDER NOTE - PATIENT PORTAL LINK FT
You can access the FollowMyHealth Patient Portal offered by [facility] by registering at the following website: [email or web address]. By joining [facility]’s FollowMyHealth portal, you will also be able to view your health information using other applications (apps) compatible with our system.

## 2023-06-07 NOTE — ED PROVIDER NOTE - PHYSICAL EXAMINATION
GENERAL: Well-nourished, Well-developed. NAD.  HEAD: No visible or palpable bumps or hematomas. No ecchymosis behind ears B/L.  Eyes: PERRLA, EOMI.   Neck: Supple. FROM  CVS: Normal S1,S2. No murmurs appreciated on auscultation   RESP: No use of accessory muscles. Chest rise symmetrical with good expansion. Lungs clear to auscultation B/L. No wheezing, rales, or rhonchi auscultated.  GI: Normal auscultation of bowel sounds in all 4 quadrants. (+)epigastric ttp. Soft, Nondistended. No guarding or rebound tenderness. No CVAT B/L.  Skin: Warm, Dry. No rashes or lesions. Good cap refill < 2 sec B/L.  EXT: Radial and pedal pulses present B/L. No calf tenderness or swelling B/L. No palpable cords. No pedal edema B/L.

## 2023-06-07 NOTE — ED PROVIDER NOTE - OBJECTIVE STATEMENT
33-year-old female past medical history asthma, no surgical history presenting to the ED for evaluation of persisting, constant, sharp epigastric abdominal pain associate with nausea, vomiting and diarrhea x3 days.  Patient denies any modifying factors.  Reports she is unable to tolerate p.o. prompting visit.  Denies fever, chills, chest pain, shortness of breath, dysuria, hematuria.

## 2023-06-07 NOTE — ED PROVIDER NOTE - NSFOLLOWUPINSTRUCTIONS_ED_ALL_ED_FT
follow up with your primary care doctor within 1-3 days    Abdominal Pain    Many things can cause abdominal pain. Many times, abdominal pain is not caused by a disease and will improve without treatment. Your health care provider will do a physical exam to determine if there is a dangerous cause of your pain; blood tests and imaging may help determine the cause of your pain. However, in many cases, no cause may be found and you may need further testing as an outpatient. Monitor your abdominal pain for any changes.     SEEK IMMEDIATE MEDICAL CARE IF YOU HAVE ANY OF THE FOLLOWING SYMPTOMS: worsening abdominal pain, uncontrollable vomiting, profuse diarrhea, inability to have bowel movements or pass gas, black or bloody stools, fever accompanying chest pain or back pain, or fainting. These symptoms may represent a serious problem that is an emergency. Do not wait to see if the symptoms will go away. Get medical help right away. Call 911 and do not drive yourself to the hospital.

## 2023-06-07 NOTE — ED ADULT NURSE NOTE - OBJECTIVE STATEMENT
Patient came in with complaints of abdominal pain x 3 days accompanied by nausea, vomiting and mild diarrhea. Denies fever and chills or sick contact in the family. As per mom, patient is under a lot of stress recently.

## 2023-06-07 NOTE — ED PROVIDER NOTE - CLINICAL SUMMARY MEDICAL DECISION MAKING FREE TEXT BOX
Laboratory results reviewed and discussed with patient. CBC shows normal WBC count, Hb/Hct and plateelet count are WNL. BMP shows electrolytes WNL and no MEDARDO.  patient remained stable in ED, improved well, results of the diagnostic studies reviewed and discussed with patient, Patient remained awake, alert, ambulatory and comfortable, tolerated PO. Discussed with patient in detail about the need for close outpatient follow up and the need to return to ED for any persistent, or worsening symptoms, for any new symptoms/concerns. patient verbalized understanding and agreed. patient is given detail aftercare instructions and is instructed well to f/u as outpatient for further care.

## 2023-06-07 NOTE — ED PROVIDER NOTE - ATTENDING APP SHARED VISIT CONTRIBUTION OF CARE
Patient is c/o nausea/vomiting/diarrhea x 3 days. Patient denies blood in the stool.   Vitals reviewed.   Lungs: CTA, no wheezing, no crackles.  Abd: +BS, NT, ND, soft, no rebound, no guarding.   A/P: Gastroenteritis,   labs, reevaluation.

## 2023-06-08 VITALS
DIASTOLIC BLOOD PRESSURE: 56 MMHG | SYSTOLIC BLOOD PRESSURE: 105 MMHG | TEMPERATURE: 98 F | OXYGEN SATURATION: 96 % | HEART RATE: 68 BPM | RESPIRATION RATE: 18 BRPM

## 2023-06-08 PROCEDURE — 76705 ECHO EXAM OF ABDOMEN: CPT | Mod: 26

## 2023-06-10 LAB
-  AMIKACIN: SIGNIFICANT CHANGE UP
-  AMOXICILLIN/CLAVULANIC ACID: SIGNIFICANT CHANGE UP
-  AMPICILLIN/SULBACTAM: SIGNIFICANT CHANGE UP
-  AMPICILLIN: SIGNIFICANT CHANGE UP
-  AZTREONAM: SIGNIFICANT CHANGE UP
-  CEFAZOLIN: SIGNIFICANT CHANGE UP
-  CEFEPIME: SIGNIFICANT CHANGE UP
-  CEFOXITIN: SIGNIFICANT CHANGE UP
-  CEFTRIAXONE: SIGNIFICANT CHANGE UP
-  CIPROFLOXACIN: SIGNIFICANT CHANGE UP
-  ERTAPENEM: SIGNIFICANT CHANGE UP
-  GENTAMICIN: SIGNIFICANT CHANGE UP
-  IMIPENEM: SIGNIFICANT CHANGE UP
-  LEVOFLOXACIN: SIGNIFICANT CHANGE UP
-  MEROPENEM: SIGNIFICANT CHANGE UP
-  NITROFURANTOIN: SIGNIFICANT CHANGE UP
-  PIPERACILLIN/TAZOBACTAM: SIGNIFICANT CHANGE UP
-  TOBRAMYCIN: SIGNIFICANT CHANGE UP
-  TRIMETHOPRIM/SULFAMETHOXAZOLE: SIGNIFICANT CHANGE UP
METHOD TYPE: SIGNIFICANT CHANGE UP

## 2023-06-10 RX ORDER — CEFPODOXIME PROXETIL 100 MG
1 TABLET ORAL
Qty: 14 | Refills: 0
Start: 2023-06-10 | End: 2023-06-16

## 2023-06-11 LAB
-  AMPICILLIN: SIGNIFICANT CHANGE UP
-  CIPROFLOXACIN: SIGNIFICANT CHANGE UP
-  LEVOFLOXACIN: SIGNIFICANT CHANGE UP
-  NITROFURANTOIN: SIGNIFICANT CHANGE UP
-  TETRACYCLINE: SIGNIFICANT CHANGE UP
-  VANCOMYCIN: SIGNIFICANT CHANGE UP
CULTURE RESULTS: SIGNIFICANT CHANGE UP
METHOD TYPE: SIGNIFICANT CHANGE UP
ORGANISM # SPEC MICROSCOPIC CNT: SIGNIFICANT CHANGE UP
SPECIMEN SOURCE: SIGNIFICANT CHANGE UP

## 2023-06-13 ENCOUNTER — OUTPATIENT (OUTPATIENT)
Dept: OUTPATIENT SERVICES | Facility: HOSPITAL | Age: 33
LOS: 1 days | End: 2023-06-13
Payer: COMMERCIAL

## 2023-06-13 ENCOUNTER — APPOINTMENT (OUTPATIENT)
Dept: PSYCHIATRY | Facility: CLINIC | Age: 33
End: 2023-06-13

## 2023-06-13 DIAGNOSIS — Z90.89 ACQUIRED ABSENCE OF OTHER ORGANS: Chronic | ICD-10-CM

## 2023-06-13 DIAGNOSIS — F33.1 MAJOR DEPRESSIVE DISORDER, RECURRENT, MODERATE: ICD-10-CM

## 2023-06-13 DIAGNOSIS — F43.25 ADJUSTMENT DISORDER WITH MIXED DISTURBANCE OF EMOTIONS AND CONDUCT: ICD-10-CM

## 2023-06-13 PROCEDURE — 90791 PSYCH DIAGNOSTIC EVALUATION: CPT

## 2023-06-13 NOTE — PHYSICAL EXAM
[Cooperative] : cooperative [Anxious] : anxious [Full] : full [Clear] : clear [Linear/Goal Directed] : linear/goal directed [None Reported] : none reported [Average] : average [WNL] : within normal limits

## 2023-06-13 NOTE — REASON FOR VISIT
[Number can be texted] : number can be texted [OK  to leave message] : OK  to leave message [Community Based Organization] : Community Based Organization [[facility] Provider/Facility] : [facility] Provider/Facility [Patient] : Patient [FreeTextEntry3] : [email or web address] [FreeTextEntry5] : English [FreeTextEntry6] : Hoa [FreeTextEntry7] : she/her [FreeTextEntry2] : Adjustment Disorder [FreeTextEntry1] : Adjustment Disorder related to employment/patient placed on leave of absence.

## 2023-06-13 NOTE — SOCIAL HISTORY
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

## 2023-06-13 NOTE — DISCUSSION/SUMMARY
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 She reports a situation in which she fell and hurt her knee, received treatment in the ED and took time off from work (December 2022). She states she notified her chief resident but was accused of not informing her employer/attending MD, who then made a wellness visit to her home. Patient reports she was sharing her grievance for the first time she was placed on leave, and someone reported her to  for being “abusive" which patient denies, other incidences with a text sent to another resident “she was racist” and nurse who was insisting she was the assigned doctor on a case, when as per patient she was not. Patient denies suicidal ideations, plan or intent. 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 Patient states last week she experienced nausea and vomiting which she believes was related to IBS but was diagnosed with a UTI. Patient reports lack of appetite due to upset stomach, sleeping 7-8 hours per night. She minimizes any wrong doing on her part but did report feeling as if she should have informed her attending when she needed time off. Patient denies any fluctuation in her mood or family observing any changes in her behavior or mood. Patient denies prior psychiatric admissions or treatment. She denies history of suicide attempts or self-injurious behavior. Patient denies suicidal ideation, plan or intent. Patient denies history of trauma or abuse. Patient denies smoking cigarettes or vaping. Patient reports past medical history of Asthma and IBS. Patient denies taking any medication. \par \par Patient was born in [place] and moved to [place] at age 12 with her parents, no siblings. She reports having a supportive family which includes her aunt, uncle and cousins in [place], [place] and [place], visiting them monthly. She reports her father passed away of Cancer in 2018. Patient also reports having some friends she can confide in. Patient reports developmental milestones were within normal limits. She reports having a masters/medical degree. She denies practicing any Mu-ism, stating that her mother is Presybeterian and practices Gnosticist. Patient enjoys biking, hiking and being in nature. She reports she is using this time off from work to study for her exam in August. Patient identifies her goals as “meet expectations at work”, “manage myself better”, “commit to the process to succeed”. Patient agreed to follow up appointment/session with Jessica King LCSW on 6/16/23. \par \par Assessment Summary:\par Assessed Needs/ Functional Domain:Adjustment disorder with mixed disturbance of emotions and conduct.\par Prioritized Assessed Needs: Adjustment Disorder\par Life Goals, Strengths, Barriers, Past Successes: "meet expectations at work","manage myself better","Commit to the process to succeed",educational success and achievements.\par \par Recommendations:\par \par [] Medication Only:\par []  Individual Therapy Only:\par x[] Medication and Individual Therapy:\par [ ] Group Therapy: \par \par

## 2023-06-13 NOTE — FAMILY HISTORY
[FreeTextEntry1] : Family Composition:Patient resides alone,her mother stays with her when visiting from [place].\par \par Family History and Background:Patient was born in [place],moved to [place] at age 12,moving to [facility] to [facility] in 2018 and in  came to [place] for her residency.\par \par Family Relationship:"Good" relationship with parents growing up, no siblings.\par \par Pertinent Family Medical, MH and Substance Use History including Adult Child of Alcoholic and child of Substance abuse status;history of cancer and heart disease: \par Father:  2018 - Cancer\par No family history of mental illness,substance abuse or suicide attempts.\par \par \par

## 2023-06-13 NOTE — RISK ASSESSMENT
[Clinical Interview] : Clinical Interview [Identifies reasons for living] : identifies reasons for living [Supportive social network of family or friends] : supportive social network of family or friends [None in the patient's lifetime] : None in the patient's lifetime [None Known] : none known [Residential stability] : residential stability [Relationship stability] : relationship stability [No] : no [No known suicide factors] : No known suicide factors [Non-compliant or not receiving treatment] : non-compliant or not receiving treatment [Other: ___] : [unfilled] [TextBox_32] : Patient denies suicidal ideations, plan or intent. [No known risk factors] : No known risk factors [No known protective factors] : No known protective factors

## 2023-06-13 NOTE — HISTORY OF PRESENT ILLNESS
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 She reports a situation in which she fell and hurt her knee, received treatment in the ED and took time off from work (December 2022). She states she notified her chief resident but was accused of not informing her employer/attending MD, who then made a wellness visit to her home. Patient reports she was sharing her grievance for the first time she was placed on leave, and someone reported her to  for being “abusive" which patient denies, other incidences with a text sent to another resident “she was racist” and nurse who was insisting she was the assigned doctor on a case, when as per patient she was not. Patient denies suicidal ideations, plan or intent. 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 Patient states last week she experienced nausea and vomiting which she believes was related to IBS but was diagnosed with a UTI. Patient reports lack of appetite due to upset stomach, sleeping 7-8 hours per night. She minimizes any wrong doing on her part but did report feeling as if she should have informed her attending when she needed time off. Patient denies any fluctuation in her mood or family observing any changes in her behavior or mood. Patient denies prior psychiatric admissions or treatment. She denies history of suicide attempts or self-injurious behavior. Patient denies suicidal ideation, plan or intent. Patient denies history of trauma or abuse. Patient denies smoking cigarettes or vaping. Patient reports past medical history of Asthma and IBS. Patient denies taking any medication. \par \par Patient was born in [place] and moved to [place] at age 12 with her parents, no siblings. She reports having a supportive family which includes her aunt, uncle and cousins in [place], [place] and [place], visiting them monthly. She reports her father passed away of Cancer in 2018. Patient also reports having some friends she can confide in. Patient reports developmental milestones were within normal limits. She reports having a masters/medical degree. She denies practicing any Sikh, stating that her mother is Baptist and practices Gnosticist. Patient enjoys biking, hiking and being in nature. She reports she is using this time off from work to study for her exam in August. Patient identifies her goals as “meet expectations at work”, “manage myself better”, “commit to the process to succeed”. Patient agreed to follow up appointment/session with Jessica King LCSW on 6/16/23.

## 2023-06-13 NOTE — PAST MEDICAL HISTORY
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 She reports a situation in which she fell and hurt her knee, received treatment in the ED and took time off from work (December 2022). She states she notified her chief resident but was accused of not informing her employer/attending MD, who then made a wellness visit to her home. Patient reports she was sharing her grievance for the first time she was placed on leave, and someone reported her to  for being “abusive" which patient denies, other incidences with a text sent to another resident “she was racist” and nurse who was insisting she was the assigned doctor on a case, when as per patient she was not. Patient denies suicidal ideations, plan or intent. 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 Patient states last week she experienced nausea and vomiting which she believes was related to IBS but was diagnosed with a UTI. Patient reports lack of appetite due to upset stomach, sleeping 7-8 hours per night. She minimizes any wrong doing on her part but did report feeling as if she should have informed her attending when she needed time off. Patient denies any fluctuation in her mood or family observing any changes in her behavior or mood. Patient denies prior psychiatric admissions or treatment. She denies history of suicide attempts or self-injurious behavior. Patient denies suicidal ideation, plan or intent. Patient denies history of trauma or abuse. Patient denies smoking cigarettes or vaping. Patient reports past medical history of Asthma and IBS. Patient denies taking any medication. \par \par Patient was born in [place] and moved to [place] at age 12 with her parents, no siblings. She reports having a supportive family which includes her aunt, uncle and cousins in [place], [place] and [place], visiting them monthly. She reports her father passed away of Cancer in 2018. Patient also reports having some friends she can confide in. Patient reports developmental milestones were within normal limits. She reports having a masters/medical degree. She denies practicing any Christianity, stating that her mother is Gnosticist and practices Religious. Patient enjoys biking, hiking and being in nature. She reports she is using this time off from work to study for her exam in August. Patient identifies her goals as “meet expectations at work”, “manage myself better”, “commit to the process to succeed”. Patient agreed to follow up appointment/session with Jessica King LCSW on 6/16/23.

## 2023-06-14 DIAGNOSIS — F43.25 ADJUSTMENT DISORDER WITH MIXED DISTURBANCE OF EMOTIONS AND CONDUCT: ICD-10-CM

## 2023-06-16 ENCOUNTER — NON-APPOINTMENT (OUTPATIENT)
Age: 33
End: 2023-06-16

## 2023-06-16 ENCOUNTER — APPOINTMENT (OUTPATIENT)
Dept: PSYCHIATRY | Facility: CLINIC | Age: 33
End: 2023-06-16

## 2023-06-20 ENCOUNTER — NON-APPOINTMENT (OUTPATIENT)
Age: 33
End: 2023-06-20

## 2023-06-20 NOTE — DISCUSSION/SUMMARY
[FreeTextEntry1] : This therapist attempted contact with patient at 9:50AM this morning, calling the phone number listed on record. The phone rang, no one answered and there was no way to leave a voicemail. Therapist will continue to make efforts to engage in treatment.

## 2023-06-22 ENCOUNTER — NON-APPOINTMENT (OUTPATIENT)
Age: 33
End: 2023-06-22

## 2023-06-22 NOTE — DISCUSSION/SUMMARY
[FreeTextEntry1] : This therapist attempted contact with patient at 12:27 PM today, calling the phone number listed on record. The phone rang, no one answered and there was no way to leave a voicemail. Therapist will continue to make efforts to support engagement in treatment. \par

## 2023-06-30 ENCOUNTER — OUTPATIENT (OUTPATIENT)
Dept: OUTPATIENT SERVICES | Facility: HOSPITAL | Age: 33
LOS: 1 days | End: 2023-06-30
Payer: COMMERCIAL

## 2023-06-30 ENCOUNTER — APPOINTMENT (OUTPATIENT)
Dept: PSYCHIATRY | Facility: CLINIC | Age: 33
End: 2023-06-30

## 2023-06-30 DIAGNOSIS — Z90.89 ACQUIRED ABSENCE OF OTHER ORGANS: Chronic | ICD-10-CM

## 2023-06-30 DIAGNOSIS — F60.3 BORDERLINE PERSONALITY DISORDER: ICD-10-CM

## 2023-06-30 PROCEDURE — 90837 PSYTX W PT 60 MINUTES: CPT

## 2023-07-01 DIAGNOSIS — F60.3 BORDERLINE PERSONALITY DISORDER: ICD-10-CM

## 2023-07-05 ENCOUNTER — OUTPATIENT (OUTPATIENT)
Dept: OUTPATIENT SERVICES | Facility: HOSPITAL | Age: 33
LOS: 1 days | End: 2023-07-05
Payer: COMMERCIAL

## 2023-07-05 ENCOUNTER — APPOINTMENT (OUTPATIENT)
Dept: PSYCHIATRY | Facility: CLINIC | Age: 33
End: 2023-07-05

## 2023-07-05 DIAGNOSIS — F43.23 ADJUSTMENT DISORDER WITH MIXED ANXIETY AND DEPRESSED MOOD: ICD-10-CM

## 2023-07-05 DIAGNOSIS — Z90.89 ACQUIRED ABSENCE OF OTHER ORGANS: Chronic | ICD-10-CM

## 2023-07-05 PROCEDURE — 90834 PSYTX W PT 45 MINUTES: CPT | Mod: 95

## 2023-07-06 DIAGNOSIS — F43.23 ADJUSTMENT DISORDER WITH MIXED ANXIETY AND DEPRESSED MOOD: ICD-10-CM

## 2023-07-07 ENCOUNTER — OUTPATIENT (OUTPATIENT)
Dept: OUTPATIENT SERVICES | Facility: HOSPITAL | Age: 33
LOS: 1 days | End: 2023-07-07

## 2023-07-07 ENCOUNTER — OUTPATIENT (OUTPATIENT)
Dept: OUTPATIENT SERVICES | Facility: HOSPITAL | Age: 33
LOS: 1 days | End: 2023-07-07
Payer: COMMERCIAL

## 2023-07-07 ENCOUNTER — APPOINTMENT (OUTPATIENT)
Dept: PSYCHIATRY | Facility: CLINIC | Age: 33
End: 2023-07-07
Payer: COMMERCIAL

## 2023-07-07 ENCOUNTER — APPOINTMENT (OUTPATIENT)
Dept: PSYCHIATRY | Facility: CLINIC | Age: 33
End: 2023-07-07

## 2023-07-07 DIAGNOSIS — Z90.89 ACQUIRED ABSENCE OF OTHER ORGANS: Chronic | ICD-10-CM

## 2023-07-07 DIAGNOSIS — F43.25 ADJUSTMENT DISORDER WITH MIXED DISTURBANCE OF EMOTIONS AND CONDUCT: ICD-10-CM

## 2023-07-07 DIAGNOSIS — F41.1 GENERALIZED ANXIETY DISORDER: ICD-10-CM

## 2023-07-07 PROCEDURE — YYYYY: CPT | Mod: 1L

## 2023-07-08 DIAGNOSIS — F41.1 GENERALIZED ANXIETY DISORDER: ICD-10-CM

## 2023-07-08 DIAGNOSIS — F43.25 ADJUSTMENT DISORDER WITH MIXED DISTURBANCE OF EMOTIONS AND CONDUCT: ICD-10-CM

## 2023-07-09 PROBLEM — F43.23 ADJUSTMENT DISORDER WITH MIXED ANXIETY AND DEPRESSED MOOD: Status: ACTIVE | Noted: 2023-06-30

## 2023-07-09 NOTE — PLAN
[Cognitive and/or Behavior Therapy] : Cognitive and/or Behavior Therapy  [Dialectical Behavior Therapy] : Dialectical Behavior Therapy  [Silex Therapy] : Silex Therapy  [Psychoeducation] : Psychoeducation  [Skills training (all types)] : Skills training (all types)  [Recommended Frequency of Visits: ____] : Recommended frequency of visits: [unfilled] [Return in ____ week(s)] : Return in [unfilled] week(s) [FreeTextEntry2] : Therapy with focus on mindfulness / challenging ineffective thoughts/ skills to support emotional control. [de-identified] : Therapist and Hoa reviewed diary card/ mood journal, honing in moments of anxiety/ stress and how she managed this. Therapist explored Hoa's emotional awareness and incorporation of mindfulness/ meditation exercises into her daily routine, exploring outcomes. Therapist provided education on mindfulness; reviewing definition, components, benefits and ways to practice mindfulness.\par \par Hoa presented for virtual therapy session today as scheduled. She reported completion of mood journal, recording a few instances of anxiety/ stress that came up since last session; reflecting on how she handled this. oHa reported exercises on [employer] crista have been "very helpful" and "I making this a daily activity." She additionally reported that meditation in the AM "makes a difference for the rest of the day." Hoa reported that she has been practicing mindfulness meditations 3-4X/ day. Hoa shared shift in perspective, noting " taking the time to work on myself and learn to cope with unforeseen problems."\par Hoa shared insights into stressful situation that occurred since last session, reflecting that her use of meditation/ mindfulness resulted in "decreased ruminating" and "increased productivity'" and "calmer as opposed to stressed/ anxious." \par She noted improved awareness of anxiety/ fear that arose this week. Hoa admitted to being "initially defensive" regarding remediation plan, but now sees value, reporting, "I am learning to be more in control." She appeared receptive to additional education offered on mindfulness skills/ reinforcement of practice. [FreeTextEntry1] : Hoa will:\par -Continue to complete mood journal.\par -Utilize mindfulness/ meditation exercises at the start of morning routine (and 3-4X/ daily). \par

## 2023-07-09 NOTE — REASON FOR VISIT
[Self] : self [Patient preference] : as per patient preference [Telehealth (audio & video) - Individual/Group] : This visit was provided via telehealth using real-time 2-way audio visual technology. [Other Location: e.g. Home (Enter Location, City,State)___] : The provider was located at [unfilled]. [Home] : The patient, [unfilled], was located at home, [unfilled], at the time of the visit. [Verbal consent obtained from patient/other participant(s)] : Verbal consent for telehealth/telephonic services obtained from patient/other participant(s) [Patient] : Patient [FreeTextEntry4] : 2:02PM [FreeTextEntry5] : 2:57PM

## 2023-07-12 ENCOUNTER — OUTPATIENT (OUTPATIENT)
Dept: OUTPATIENT SERVICES | Facility: HOSPITAL | Age: 33
LOS: 1 days | End: 2023-07-12
Payer: SELF-PAY

## 2023-07-12 ENCOUNTER — APPOINTMENT (OUTPATIENT)
Dept: PSYCHIATRY | Facility: CLINIC | Age: 33
End: 2023-07-12

## 2023-07-12 DIAGNOSIS — F43.23 ADJUSTMENT DISORDER WITH MIXED ANXIETY AND DEPRESSED MOOD: ICD-10-CM

## 2023-07-12 DIAGNOSIS — Z90.89 ACQUIRED ABSENCE OF OTHER ORGANS: Chronic | ICD-10-CM

## 2023-07-12 PROCEDURE — 90834 PSYTX W PT 45 MINUTES: CPT

## 2023-07-12 NOTE — PLAN
[Cognitive and/or Behavior Therapy] : Cognitive and/or Behavior Therapy  [Dialectical Behavior Therapy] : Dialectical Behavior Therapy  [Skills training (all types)] : Skills training (all types)  [Supportive Therapy] : Supportive Therapy [de-identified] : Hoa presented to session today in-person as scheduled. She presented as stable with euthymic mood.  She reported feeling less stressed/ anxious as compared to last week. She reported continuing to use mindfulness based crista (multiple times daily) on her phone as recommended, seeing this as aiding her in feeling calm, centered and focused. Hoa reported being focused on "moving forward towards goals" as opposed to rumination on past stressors. She added that when experiencing anxiety/ stress she now looks towards to solution as opposed to focusing on the problem or emotion, offering examples. She reflected on previous instances of emotional intensity/ reaction, Hoa reported consistent, balanced sleep, denied changes in mood or behavior. She reported feeling "joyful and optimistic" as she works toward future goals.  [FreeTextEntry1] : Hoa will:\par -Continue to complete mood journal.\par -Utilize mindfulness/ meditation exercises at the start of morning routine (and 3-4X/ daily). \par

## 2023-07-13 ENCOUNTER — OUTPATIENT (OUTPATIENT)
Dept: OUTPATIENT SERVICES | Facility: HOSPITAL | Age: 33
LOS: 1 days | End: 2023-07-13
Payer: COMMERCIAL

## 2023-07-13 ENCOUNTER — APPOINTMENT (OUTPATIENT)
Dept: PSYCHIATRY | Facility: CLINIC | Age: 33
End: 2023-07-13
Payer: COMMERCIAL

## 2023-07-13 DIAGNOSIS — F33.1 MAJOR DEPRESSIVE DISORDER, RECURRENT, MODERATE: ICD-10-CM

## 2023-07-13 DIAGNOSIS — F43.23 ADJUSTMENT DISORDER WITH MIXED ANXIETY AND DEPRESSED MOOD: ICD-10-CM

## 2023-07-13 PROCEDURE — YYYYY: CPT | Mod: 1L

## 2023-07-13 NOTE — REASON FOR VISIT
[Number can be texted] : number can be texted [OK  to leave message] : OK  to leave message [Community Based Organization] : Community Based Organization [ Provider/Facility] :  Provider/Facility [Patient] : Patient [FreeTextEntry2] : N/A [FreeTextEntry3] : [email or web address] [FreeTextEntry5] : English [FreeTextEntry6] : Hoa [FreeTextEntry7] : she/her [FreeTextEntry1] : Adjustment Disorder related to employment/patient placed on leave of absence.

## 2023-07-13 NOTE — RISK ASSESSMENT
[Clinical Interview] : Clinical Interview [No known suicide factors] : No known suicide factors [Non-compliant or not receiving treatment] : non-compliant or not receiving treatment [Other: ___] : [unfilled] [Identifies reasons for living] : identifies reasons for living [Supportive social network of family or friends] : supportive social network of family or friends [None in the patient's lifetime] : None in the patient's lifetime [None Known] : none known [No known risk factors] : No known risk factors [No known protective factors] : No known protective factors [Residential stability] : residential stability [Relationship stability] : relationship stability [No] : no [TextBox_32] : Patient denies suicidal ideations, plan or intent.

## 2023-07-13 NOTE — PSYCHOSOCIAL ASSESSMENT
[None known] : None known [Other: _____] : [unfilled] [Competitive and integrated employment] : Competitive and integrated employment [35 hours or more] : 35 hours or more [Financially stable] : financially stable [None] : none [Private residence (home, apartment, rooming house, hotel, motel, supported housing, supported Single Room Occupancy (SRO),] : Private residence (home, apartment, rooming house, hotel, motel, supported housing, supported Single Room Occupancy (SRO), permanent housing programs, transient housing programs, and shelter plus care housing) [Client lives alone] : client lives alone [N/A] : n/a [Mother] : mother [Good] : good [Frequent Contact] : frequent contact [No] : Patient has personal representation (legal guardian, representative payee, conservatorship)? No [FreeTextEntry2] : Patient denies smoking cigarettes or vaping. [had nightmares about the event(s) or thought about the event(s) when you did not want] : did not have nightmares and/or unwanted thoughts about the events [tried hard not to think about the event(s) or went out of your way to avoid situations that reminded you of the event] : did not need to avoid thinking about events, did not need to avoid situations that might remind patient of events [has been constantly on guard, watchful, or easily startled] : has not been constantly on guard, watchful, or easily startled [felt numb or detached from people, activities, or your surrounding] : has not felt numb or detached from people, activities, or surroundings [felt guilty or unable to stop blaming yourself or others for the event(s) or any problems the event(s) may have caused] : has not felt guilty or unable to stop blaming self or others for event(s), or any problems the event(s) may have caused [FreeTextEntry7] : mother: Ruthann Joshi [phone] [FreeTextEntry1] : Patient reports mother is supportive.

## 2023-07-13 NOTE — HISTORY OF PRESENT ILLNESS
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 She reports a situation in which she fell and hurt her knee, received treatment in the ED and took time off from work (December 2022). She states she notified her chief resident but was accused of not informing her employer/attending MD, who then made a wellness visit to her home. Patient reports she was sharing her grievance for the first time she was placed on leave, and someone reported her to  for being “abusive" which patient denies, other incidences with a text sent to another resident “she was racist” and nurse who was insisting she was the assigned doctor on a case, when as per patient she was not. Patient denies suicidal ideations, plan or intent. 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 Patient states last week she experienced nausea and vomiting which she believes was related to IBS but was diagnosed with a UTI. Patient reports lack of appetite due to upset stomach, sleeping 7-8 hours per night. She minimizes any wrong doing on her part but did report feeling as if she should have informed her attending when she needed time off. Patient denies any fluctuation in her mood or family observing any changes in her behavior or mood. Patient denies prior psychiatric admissions or treatment. She denies history of suicide attempts or self-injurious behavior. Patient denies suicidal ideation, plan or intent. Patient denies history of trauma or abuse. Patient denies smoking cigarettes or vaping. Patient reports past medical history of Asthma and IBS. Patient denies taking any medication. \par \par Patient was born in [place] and moved to [place] at age 12 with her parents, no siblings. She reports having a supportive family which includes her aunt, uncle and cousins in [place], [place] and [place], visiting them monthly. She reports her father passed away of Cancer in 2018. Patient also reports having some friends she can confide in. Patient reports developmental milestones were within normal limits. She reports having a masters/medical degree. She denies practicing any Hindu, stating that her mother is Cheondoism and practices Religion. Patient enjoys biking, hiking and being in nature. She reports she is using this time off from work to study for her exam in August. Patient identifies her goals as “meet expectations at work”, “manage myself better”, “commit to the process to succeed”. Patient agreed to follow up appointment/session with Jessica King LCSW on 6/16/23.  [FreeTextEntry2] : Denied [FreeTextEntry3] : Denied

## 2023-07-13 NOTE — PHYSICAL EXAM
[Cooperative] : cooperative [Anxious] : anxious [Full] : full [Clear] : clear [Linear/Goal Directed] : linear/goal directed [None Reported] : none reported [Average] : average [WNL] : within normal limits [5] : 5 [6] : 6 [No] : No [SchwartzScore] : 59

## 2023-07-13 NOTE — SOCIAL HISTORY
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

## 2023-07-13 NOTE — HEALTH RISK ASSESSMENT
[Patient/Caregiver not ready to engage] : , patient/caregiver not ready to engage [Patient/Collateral not ready to engage] : , patient/collateral not ready to engage [AdvancecareDate] : 06/13/23

## 2023-07-13 NOTE — FAMILY HISTORY
[FreeTextEntry1] : Family Composition:Patient resides alone,her mother stays with her when visiting from [place].\par \par Family History and Background:Patient was born in [place],moved to [place] at age 12,moving to [facility] to [facility] in 2018 and in  came to [place] for her residency.\par \par Family Relationship:"Good" relationship with parents growing up, no siblings.\par \par Pertinent Family Medical, MH and Substance Use History including Adult Child of Alcoholic and child of Substance abuse status;history of cancer and heart disease: \par Father:  2018 - Cancer\par No family history of mental illness,substance abuse or suicide attempts.\par \par \par

## 2023-07-14 DIAGNOSIS — F33.1 MAJOR DEPRESSIVE DISORDER, RECURRENT, MODERATE: ICD-10-CM

## 2023-07-16 NOTE — PLAN
[Recommended Frequency of Visits: ____] : Recommended frequency of visits: [unfilled] [Return in ____ week(s)] : Return in [unfilled] week(s) [de-identified] : \par Patient presented in person for initial session with therapist. She reported detailed history, reporting timeline of events (from her perspective) leading up to referral for mental health treatment as part of "remediation plan." Patient shared being present for session to support plan of "pathway to graduation." She presented as compliant and agreeable, thanking therapist at several times throughout initial session. Patient shared that she downloaded (and had been utilizing) [employer] crista on her phone (this was suggested by Dania Alaniz who patient had been in contact with prior to referral to [facility]). She reportedly saw the value in learning/ utilizing mindfulness to mitigate stress. She reported deep breathing exercises as "helpful", noting decrease in stress/ response.  [FreeTextEntry1] : Patient shared goal of continuing to utilize mindfulness/ mediation apps daily and will provide feedback in upcoming session.\par Patient was given mood journal to complete to promote emotional awareness and trends/ patterns along with value of new strategies.

## 2023-07-16 NOTE — REASON FOR VISIT
[FreeTextEntry4] : 12:00PM [FreeTextEntry5] : 1:00PM [Parent(s)] : parent(s) [Sister] : sister [Pre-] : Pre- [Cat] : cat [Smokers in Household] : there are no smokers in the home [de-identified] : 3 cats

## 2023-07-27 ENCOUNTER — OUTPATIENT (OUTPATIENT)
Dept: OUTPATIENT SERVICES | Facility: HOSPITAL | Age: 33
LOS: 1 days | End: 2023-07-27
Payer: COMMERCIAL

## 2023-07-27 ENCOUNTER — OUTPATIENT (OUTPATIENT)
Dept: OUTPATIENT SERVICES | Facility: HOSPITAL | Age: 33
LOS: 1 days | End: 2023-07-27

## 2023-07-27 ENCOUNTER — APPOINTMENT (OUTPATIENT)
Dept: PSYCHIATRY | Facility: CLINIC | Age: 33
End: 2023-07-27

## 2023-07-27 ENCOUNTER — APPOINTMENT (OUTPATIENT)
Dept: PSYCHIATRY | Facility: CLINIC | Age: 33
End: 2023-07-27
Payer: COMMERCIAL

## 2023-07-27 DIAGNOSIS — F25.9 SCHIZOAFFECTIVE DISORDER, UNSPECIFIED: ICD-10-CM

## 2023-07-27 DIAGNOSIS — Z90.89 ACQUIRED ABSENCE OF OTHER ORGANS: Chronic | ICD-10-CM

## 2023-07-27 DIAGNOSIS — F43.23 ADJUSTMENT DISORDER WITH MIXED ANXIETY AND DEPRESSED MOOD: ICD-10-CM

## 2023-07-27 PROCEDURE — YYYYY: CPT | Mod: 1L

## 2023-07-28 DIAGNOSIS — F25.9 SCHIZOAFFECTIVE DISORDER, UNSPECIFIED: ICD-10-CM

## 2023-07-28 NOTE — DISCUSSION/SUMMARY
[FreeTextEntry1] : Hoa attended the session today. Documentation is on paper out of concerns for privacy.

## 2023-07-31 NOTE — PLAN
[de-identified] : Hoa presented to session today in-person as scheduled. She continued to present with stable/ euthymic mood. She reported that she returned to work on Tuesday of this week, noting increased emotions and the presence of cognitive distortions/ assumption-based thoughts as times. Patient shared use of coping strategies including mindfulness/ meditation and breathing exercises in response to stressors that occurred, noting that she was able to find control- even in situations where things were occurring out of her control. She was able to see value in strategies reviewed/ utilized and shifting outlook/ perspective to support effective problem-solving.

## 2023-07-31 NOTE — REASON FOR VISIT
[Patient] : Patient [FreeTextEntry4] : 4:00PM [FreeTextEntry5] : 4:45PM [FreeTextEntry1] : Continuation of mental health treatment.

## 2023-08-02 ENCOUNTER — APPOINTMENT (OUTPATIENT)
Dept: PSYCHIATRY | Facility: CLINIC | Age: 33
End: 2023-08-02

## 2023-08-02 ENCOUNTER — OUTPATIENT (OUTPATIENT)
Dept: OUTPATIENT SERVICES | Facility: HOSPITAL | Age: 33
LOS: 1 days | End: 2023-08-02

## 2023-08-02 DIAGNOSIS — F43.25 ADJUSTMENT DISORDER WITH MIXED DISTURBANCE OF EMOTIONS AND CONDUCT: ICD-10-CM

## 2023-08-02 DIAGNOSIS — Z90.89 ACQUIRED ABSENCE OF OTHER ORGANS: Chronic | ICD-10-CM

## 2023-08-03 ENCOUNTER — OUTPATIENT (OUTPATIENT)
Dept: OUTPATIENT SERVICES | Facility: HOSPITAL | Age: 33
LOS: 1 days | End: 2023-08-03
Payer: COMMERCIAL

## 2023-08-03 ENCOUNTER — APPOINTMENT (OUTPATIENT)
Dept: PSYCHIATRY | Facility: CLINIC | Age: 33
End: 2023-08-03
Payer: COMMERCIAL

## 2023-08-03 DIAGNOSIS — F43.25 ADJUSTMENT DISORDER WITH MIXED DISTURBANCE OF EMOTIONS AND CONDUCT: ICD-10-CM

## 2023-08-03 DIAGNOSIS — Z90.89 ACQUIRED ABSENCE OF OTHER ORGANS: Chronic | ICD-10-CM

## 2023-08-03 PROCEDURE — XXXXX: CPT | Mod: 1L

## 2023-08-04 DIAGNOSIS — F43.25 ADJUSTMENT DISORDER WITH MIXED DISTURBANCE OF EMOTIONS AND CONDUCT: ICD-10-CM

## 2023-08-04 NOTE — PLAN
[Cognitive and/or Behavior Therapy] : Cognitive and/or Behavior Therapy  [Skills training (all types)] : Skills training (all types)  [Supportive Therapy] : Supportive Therapy [de-identified] : Hoa presented to session today in-person as scheduled. She continued to present with stable/ euthymic mood. She reflected on improved ability to accept unwanted situations/ stressors, sharing about a few scenarios this week that brought on feelings of stress/ anxiety. Hoa was able to validate emotions and frustration and uncertainty related to upcoming anticipated changes. Hoa shared that use of coping skills have supported "control over emotions" to "resolve the situation effectively." She noted importance of self-reflection on past experiences and what she could do in response to emotionally-provoking situations differently in the future. Hao continued to reflect on use of mindfulness practices daily, noting follow through on plan of utilizing guided meditations in the morning before work this week. She noted that her "facial muscles were more relaxed" after practicing and that this resulted in clarity of thought and feeling more balanced before she started her day. Hoa continued to share value in strategies reviewed/ utilized and shifting outlook/ perspective to support effective problem-solving.   [FreeTextEntry1] : Hoa is anticipating completion of program and moving to [place] at the end of this week. Today was last planned therapy session. Hoa shared continued intention to use strategies learned/ reinforced in therapy that she has found to be beneficial. Upon confirmation of completion/ move, case will be closed as services will no longer be provided at clinic.  Hoa was aware of her ability to seek therapy following move, if desired, to aid in change/ transition and support continued effectiveness in new endeavors.

## 2023-11-30 ENCOUNTER — TRANSCRIPTION ENCOUNTER (OUTPATIENT)
Age: 33
End: 2023-11-30

## 2024-02-28 NOTE — ED PROVIDER NOTE - CONDITION AT DISCHARGE:
IUD INSERTION AFTER CARE    Today you had an IUD inserted. It is common to have some uterine crampiness and light bleeding after the insertion.  Please refer to the IUD information pamphlet that was provided to familiarize yourself with the side effects.      Call the office if you develop worrisome symptoms such as very heavy bleeding or severe pain.  Call us also if  you develop fever, chills or foul smelling discharge    Your partner may feel the strings for a few weeks but they should not be painful    Refer to the IUD information pamphlet on checking the strings    Return to the clinic in one month for a check    
Improved

## 2024-10-18 DIAGNOSIS — F43.23 ADJUSTMENT DISORDER WITH MIXED ANXIETY AND DEPRESSED MOOD: ICD-10-CM

## 2025-02-20 NOTE — ED ADULT NURSE NOTE - NSSEPSISSUSPECTED_ED_A_ED
NEUROSURGERY INPATIENT CONSULT NOTE    Chief Complaint: low back pain    HPI:   Haritha Dominguez is a 78 y.o.  female who is known to our services and previously underwent a spinal cord stimulator battery replacement who presents today for evaluation of low back pain that was worsening throughout the day yesterday. She currently rates the pain as a 20/10. SCS did not help with the pain. She denies any pain down the legs. No numbness or weakness. No bowel or bladder incontinence. No known trauma.     CT scan with L2 compression fracture which is why Neurosurgery was consulted.     Past Medical History:   Diagnosis Date    Abrasion of skin of left lower leg 11/04/2024    Abrasion of skin of right lower leg 11/04/2024    Anxiety     Arthritis     Asthma     Claustrophobia     Decreased dorsalis pedis pulse 11/04/2024    Dermatitis 02/2017    bilateral legs knees to ankle; follows with [facility]    Fracture 02/2017    \"in Spine\" compression T10 per pt; difficulty laying flat    GERD (gastroesophageal reflux disease)     Hiatal hernia     History of blood transfusion     HTN (hypertension) 04/22/2013    Hyperlipidemia     Itching 11/04/2024    On aspirin at home     for age per pcp    Pancreatic cyst 05/27/2017    Pneumonia 07/2018    Sleep apnea     SOB (shortness of breath) 04/22/2013    Tachycardia 04/22/2013    follows with Dr NUPUR Chavez     Past Surgical History:   Procedure Laterality Date    BACK SURGERY  08/2014    has screws in back    CATARACT REMOVAL Bilateral 12/06/2013    [facility] with lens implants    CHOLECYSTECTOMY      JOINT REPLACEMENT  12/16/2016    SI joint fusion Right    OTHER SURGICAL HISTORY  02/09/2017    MRI -     SPINAL FIXATION SURGERY WITH IMPLANT  2013    in [place]    STIMULATOR SURGERY Right 5/31/2023    Spinal cord stimulator battery replacement performed by Anabell Purcell MD at [facility]    TOTAL KNEE ARTHROPLASTY Right     UPPER GASTROINTESTINAL ENDOSCOPY  07/10/2017       Family History   Problem Relation Age of Onset    Stroke Mother     Heart Disease Mother     Hypertension Mother     Other Mother         CHOLECYSTECTOMY; OSTEOPENIA    Heart Disease Father     Hypertension Father     Diabetes Father     Arthritis Father     Asthma Daughter         Medications:   Current Facility-Administered Medications   Medication Dose Route Frequency Provider Last Rate Last Admin    lidocaine 1 % injection 5 mL  5 mL IntraDERmal Once Oliver Waters MD        ondansetron (ZOFRAN) injection 4 mg  4 mg IntraVENous Q6H PRN Oliver Waters MD   4 mg at 02/19/25 0264     Current Outpatient Medications   Medication Sig Dispense Refill    montelukast (SINGULAIR) 10 MG tablet TAKE 1 TABLET DAILY 90 tablet 1    celecoxib (CELEBREX) 200 MG capsule Take 1 capsule by mouth daily      hydrocortisone (CORTEF) 10 MG tablet 1 tablets (10 mg) in the morning 90 tablet 3    aspirin 81 MG EC tablet Take 1 tablet by mouth daily      vitamin D (VITAMIN D3) 50 MCG (2000 UT) CAPS capsule Take 1 capsule by mouth 2 times daily      vitamin C (ASCORBIC ACID) 500 MG tablet Take 1 tablet by mouth daily      fluticasone-umeclidin-vilant (TRELEGY ELLIPTA) 200-62.5-25 MCG/ACT AEPB inhaler Inhale 1 puff into the lungs daily 180 each 2    albuterol (PROVENTIL) (2.5 MG/3ML) 0.083% nebulizer solution Take 3 mLs by nebulization 4 times daily as needed for Wheezing Please bill under Medicare Part B DX: Asthma J45.5 120 each 3    torsemide (DEMADEX) 20 MG tablet Take 1 tablet by mouth daily      potassium chloride (MICRO-K) 10 MEQ extended release capsule Take by mouth daily      pregabalin (LYRICA) 50 MG capsule Take 1 capsule by mouth 2 times daily.      CALCIUM PO Take 1 tablet by mouth daily      MAGNESIUM PO Take 133 mg by mouth daily      pantoprazole (PROTONIX) 40 MG tablet Take 1 tablet by mouth 2 times daily (before meals) (Patient taking differently: Take 1 tablet by mouth daily) 30 tablet 3    fluticasone (FLONASE)  No

## 2025-04-22 NOTE — ASSESSMENT
[FreeTextEntry1] : Pt is a 31 yo F here for annual wellness also complaining of L shoulder pain \par \par #Acute L shoulder pain\par - ROM limited on physical exam\par - f/u xray of L shoulder \par - MRI xray nonrevealing \par \par \par \par #HCM\par - routine labs \par - RTC as needed  person
